# Patient Record
Sex: FEMALE | Race: WHITE | Employment: OTHER | URBAN - METROPOLITAN AREA
[De-identification: names, ages, dates, MRNs, and addresses within clinical notes are randomized per-mention and may not be internally consistent; named-entity substitution may affect disease eponyms.]

---

## 2019-08-14 ENCOUNTER — APPOINTMENT (OUTPATIENT)
Dept: ULTRASOUND IMAGING | Age: 64
DRG: 439 | End: 2019-08-14
Attending: FAMILY MEDICINE
Payer: COMMERCIAL

## 2019-08-14 ENCOUNTER — HOSPITAL ENCOUNTER (INPATIENT)
Age: 64
LOS: 5 days | Discharge: HOME | DRG: 439 | End: 2019-08-19
Attending: INTERNAL MEDICINE | Admitting: FAMILY MEDICINE
Payer: COMMERCIAL

## 2019-08-14 PROBLEM — N17.9 AKI (ACUTE KIDNEY INJURY) (HCC): Status: ACTIVE | Noted: 2019-08-14

## 2019-08-14 PROBLEM — E87.5 HYPERKALEMIA: Status: ACTIVE | Noted: 2019-08-14

## 2019-08-14 PROBLEM — K85.90 ACUTE PANCREATITIS: Status: ACTIVE | Noted: 2019-08-14
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
## 2019-08-14 PROBLEM — R79.89 ELEVATED LFTS: Status: ACTIVE | Noted: 2019-08-14

## 2019-08-14 PROBLEM — R10.9 ABDOMINAL PAIN: Status: ACTIVE | Noted: 2019-08-14

## 2019-08-14 LAB
AMPHET UR QL SCN: NEGATIVE
BARBITURATES UR QL SCN: NEGATIVE
BENZODIAZ UR QL: NEGATIVE
CANNABINOIDS UR QL SCN: NEGATIVE
CHOLEST SERPL-MCNC: 120 MG/DL
COCAINE UR QL SCN: NEGATIVE
ETHANOL SERPL-MCNC: <3 MG/DL
HDLC SERPL-MCNC: 21 MG/DL (ref 40–60)
HDLC SERPL: 5.7 {RATIO}
LDLC SERPL CALC-MCNC: 79.6 MG/DL
LIPASE SERPL-CCNC: 483 U/L (ref 73–393)
LIPID PROFILE,FLP: ABNORMAL
METHADONE UR QL: NEGATIVE
OPIATES UR QL: POSITIVE
PCP UR QL: NEGATIVE
TRIGL SERPL-MCNC: 97 MG/DL (ref 35–150)
VLDLC SERPL CALC-MCNC: 19.4 MG/DL (ref 6–23)

## 2019-08-14 PROCEDURE — 80307 DRUG TEST PRSMV CHEM ANLYZR: CPT

## 2019-08-14 PROCEDURE — 85025 COMPLETE CBC W/AUTO DIFF WBC: CPT

## 2019-08-14 PROCEDURE — 77030020263 HC SOL INJ SOD CL0.9% LFCR 1000ML

## 2019-08-14 PROCEDURE — 80061 LIPID PANEL: CPT

## 2019-08-14 PROCEDURE — 74011250636 HC RX REV CODE- 250/636: Performed by: FAMILY MEDICINE

## 2019-08-14 PROCEDURE — 80074 ACUTE HEPATITIS PANEL: CPT

## 2019-08-14 PROCEDURE — 65270000029 HC RM PRIVATE

## 2019-08-14 PROCEDURE — 36415 COLL VENOUS BLD VENIPUNCTURE: CPT

## 2019-08-14 PROCEDURE — 76705 ECHO EXAM OF ABDOMEN: CPT

## 2019-08-14 PROCEDURE — 83690 ASSAY OF LIPASE: CPT

## 2019-08-14 RX ORDER — HEPARIN SODIUM 5000 [USP'U]/ML
5000 INJECTION, SOLUTION INTRAVENOUS; SUBCUTANEOUS EVERY 8 HOURS
Status: DISCONTINUED | OUTPATIENT
Start: 2019-08-14 | End: 2019-08-19 | Stop reason: HOSPADM

## 2019-08-14 RX ORDER — ACETAMINOPHEN 325 MG/1
650 TABLET ORAL
Status: DISCONTINUED | OUTPATIENT
Start: 2019-08-14 | End: 2019-08-19 | Stop reason: HOSPADM

## 2019-08-14 RX ORDER — SODIUM CHLORIDE 0.9 % (FLUSH) 0.9 %
5-40 SYRINGE (ML) INJECTION EVERY 8 HOURS
Status: DISCONTINUED | OUTPATIENT
Start: 2019-08-14 | End: 2019-08-19 | Stop reason: HOSPADM

## 2019-08-14 RX ORDER — SODIUM CHLORIDE 9 MG/ML
150 INJECTION, SOLUTION INTRAVENOUS CONTINUOUS
Status: DISCONTINUED | OUTPATIENT
Start: 2019-08-14 | End: 2019-08-15

## 2019-08-14 RX ORDER — HYDROMORPHONE HYDROCHLORIDE 1 MG/ML
0.5 INJECTION, SOLUTION INTRAMUSCULAR; INTRAVENOUS; SUBCUTANEOUS
Status: DISCONTINUED | OUTPATIENT
Start: 2019-08-14 | End: 2019-08-15

## 2019-08-14 RX ORDER — ADHESIVE BANDAGE
30 BANDAGE TOPICAL DAILY PRN
Status: DISCONTINUED | OUTPATIENT
Start: 2019-08-14 | End: 2019-08-19 | Stop reason: HOSPADM

## 2019-08-14 RX ORDER — SODIUM CHLORIDE 0.9 % (FLUSH) 0.9 %
5-40 SYRINGE (ML) INJECTION AS NEEDED
Status: DISCONTINUED | OUTPATIENT
Start: 2019-08-14 | End: 2019-08-19 | Stop reason: HOSPADM

## 2019-08-14 RX ORDER — ONDANSETRON 2 MG/ML
4 INJECTION INTRAMUSCULAR; INTRAVENOUS
Status: DISCONTINUED | OUTPATIENT
Start: 2019-08-14 | End: 2019-08-19 | Stop reason: HOSPADM

## 2019-08-14 RX ADMIN — HEPARIN SODIUM 5000 UNITS: 5000 INJECTION INTRAVENOUS; SUBCUTANEOUS at 22:29

## 2019-08-14 RX ADMIN — HYDROMORPHONE HYDROCHLORIDE 0.5 MG: 1 INJECTION, SOLUTION INTRAMUSCULAR; INTRAVENOUS; SUBCUTANEOUS at 22:25

## 2019-08-14 RX ADMIN — SODIUM CHLORIDE 150 ML/HR: 900 INJECTION, SOLUTION INTRAVENOUS at 22:15

## 2019-08-14 RX ADMIN — Medication 10 ML: at 22:26

## 2019-08-14 NOTE — H&P
HOSPITALIST INITIAL HISTORY AND PHYSICAL    NAME:  Joao Giordano   Age:  62 y.o.  :   1955   MRN:   [id number]  PCP: Tanner Aragon  Consulting MD:  Treatment Team: Attending Provider: Shlomo Aguilar DO    CHIEF COMPLAINT: myalgias    HISTORY OF PRESENT ILLNESS:   Joao Giordano is a 62 y.o. female who presents as a direct admit from [facility] for abnormal CT abdomen showing possible pancreatitis. She presented initially for feeling weak and painful all over for the past several days. She also admits to a fall yesterday. Denies any fevers, but admits to chills. Admits to nausea, denies vomiting. Admits to diarrhea. David Watts REVIEW OF SYSTEMS: Comprehensive ROS performed. Denies fevers, vomiting, otherwise negative. No past medical history on file. No past surgical history on file. None       Allergies not on file    FAMILY HISTORY: Reviewed. Negative except No family history on file. Social History     Tobacco Use    Smoking status: Not on file   Substance Use Topics    Alcohol use: Not on file         Objective:     Visit Vitals  /79 (BP 1 Location: Right arm, BP Patient Position: At rest)   Pulse 97   Temp 97.3 °F (36.3 °C)   Resp 18   SpO2 92%      Temp (24hrs), Av.3 °F (36.3 °C), Min:97.3 °F (36.3 °C), Max:97.3 °F (36.3 °C)    Oxygen Therapy  O2 Sat (%): 92 % (19)  Physical Exam:  General:    The patient is an anxious appearing  Middle aged female appearing older than stated age in no acute distress. Head:   Normocephalic/atraumatic. Eyes:  No palpebral pallor or scleral icterus. ENT:  External auricular and nasal exam within normal limits. Mucous membranes are moist.  Neck:  Supple, non-tender, no JVD. Lungs:   Clear to auscultation bilaterally without wheezes or crackles. No respiratory distress or accessory muscle use. Heart:   Regular rate and rhythm, without murmurs, rubs, or gallops.   Abdomen:   Soft, mildly TTP over epigastrium, non-distended with normoactive bowel sounds. Genitourinary: No tenderness over the bladder or bilateral CVAs. Extremities: Without clubbing, cyanosis, or edema. Skin:     Normal color, texture, and turgor. No rashes, lesions, or jaundice. Pulses: Radial and dorsalis pedis pulses present 2+ bilaterally. Capillary refill <2s. Neurologic: CN II-XII grossly intact and symmetrical.     Moving all four extremities well with no focal deficits. Psychiatric: Pleasant demeanor, anxious affect. Alert and oriented x 3    Data Review: At [facility] MD [place] 24 8/14/19:  WBC 10.1, Hbg 13.1, Plt 199  Na 128, K 6.2, CO2 21, ALT 1244, , , Cr 5.9, GGT 40, TBili 0.8, TProt 6.9, Alk phos 77, Alb 3.6  INR 1.0  U/A Yellow, clear, Glc neg, Bili neg, Ket neg, Mod Blood, pH 5.5, Prot 30, Urobil 0.2, Nit neg, LE neg  Trop < 0.05  CK MB> 80  Lipase ? Imaging Gabi Ebbs /Studies: At Emergency MD [place] 8/14/19:  EKG: Sinus tach, 108, no STchanges    CT Chest/Abd/Pelv wo contrast:   \"No pneumothorax. Trace bilateral pleural effusions. No intra-abdominal or intrapelvic hemorrhage. Please note that traumatic intra-abdominal organ injury cannot be completely excluded in the absence of intravenous contrast.   Peripancreatic inflammation suspicious for acute pancreatitis. Adjacent fluid collection in the lesser sac as above, presumably a pseudocyst. Correlate with clinical presentation and serum pancreatic markers. Bilateral breast implants with suspicion for intracapsular rupture. ELECTRONICALLY SIGNED BY: Kade Hoffmann MD Aug 14, 2019\"    Assessment and Plan:     Principal Problem:    Acute pancreatitis (8/14/2019)    Mild, given lipase in 400s. Lipase level was not reported on records from [facility]. RUQ unremarkable for biliary/pancreatic obstruction. LIpid panel pending. Pt denies alcohol use, BAL negative.      Active Problems:    Abdominal pain (8/14/2019)    Likely gastritis/mild pancreatitis. Follow cliniclaly. Fall (8/14/2019)    PT/OT consults. ANABEL (acute kidney injury) ([facility] [address] (8/14/2019)    Likely dehydration. IVF, follow BMP. Elevated LFTs (0/47/3412)    Uncertain etiology. RUQ shows normal appearing liver. IVF, hepatitis panel pending. Hyperkalemia (8/14/2019)    6.2 at [facility], repeat pending. IVF. DVT Prophylaxis: Heparin      Code Status: FULL CODE      Disposition: Admit to med/surg for evaluation and treatment as per above.       Anticipated discharge: 2-3 days     Signed By: Hannah Purdy MD     August 14, 2019

## 2019-08-15 PROBLEM — K85.00 IDIOPATHIC ACUTE PANCREATITIS WITHOUT INFECTION OR NECROSIS: Status: ACTIVE | Noted: 2019-08-14

## 2019-08-15 LAB
ALBUMIN SERPL-MCNC: 2.7 G/DL (ref 3.2–4.6)
ALBUMIN/GLOB SERPL: 0.7 {RATIO} (ref 1.2–3.5)
ALP SERPL-CCNC: 105 U/L (ref 50–136)
ALT SERPL-CCNC: 995 U/L (ref 12–65)
ANION GAP SERPL CALC-SCNC: 15 MMOL/L (ref 7–16)
AST SERPL-CCNC: 602 U/L (ref 15–37)
BASOPHILS NFR BLD: 0 % (ref 0–2)
BILIRUB SERPL-MCNC: 0.7 MG/DL (ref 0.2–1.1)
BUN SERPL-MCNC: 101 MG/DL (ref 8–23)
CALCIUM SERPL-MCNC: 8.6 MG/DL (ref 8.3–10.4)
CHLORIDE SERPL-SCNC: 109 MMOL/L (ref 98–107)
CO2 SERPL-SCNC: 14 MMOL/L (ref 21–32)
CREAT SERPL-MCNC: 4.99 MG/DL (ref 0.6–1)
DIFFERENTIAL METHOD BLD: ABNORMAL
EOSINOPHIL NFR BLD: 0 % (ref 0.5–7.8)
ERYTHROCYTE [DISTWIDTH] IN BLOOD BY AUTOMATED COUNT: 12.9 % (ref 11.9–14.6)
GLOBULIN SER CALC-MCNC: 3.8 G/DL (ref 2.3–3.5)
GLUCOSE BLD STRIP.AUTO-MCNC: 55 MG/DL (ref 65–100)
GLUCOSE BLD STRIP.AUTO-MCNC: 85 MG/DL (ref 65–100)
GLUCOSE SERPL-MCNC: 46 MG/DL (ref 65–100)
HCT VFR BLD AUTO: 35.5 % (ref 35.8–46.3)
HGB BLD-MCNC: 12 G/DL (ref 11.7–15.4)
IMM GRANULOCYTES NFR BLD AUTO: 0 % (ref 0–5)
LIPASE SERPL-CCNC: 355 U/L (ref 73–393)
LYMPHOCYTES NFR BLD: 5 % (ref 13–44)
MCH RBC QN AUTO: 31.8 PG (ref 26.1–32.9)
MCHC RBC AUTO-ENTMCNC: 33.8 G/DL (ref 31.4–35)
MCV RBC AUTO: 94.2 FL (ref 79.6–97.8)
MONOCYTES NFR BLD: 6 % (ref 4–12)
NEUTS SEG NFR BLD: 89 % (ref 43–78)
NRBC # BLD: 0 K/UL (ref 0–0.2)
PLATELET # BLD AUTO: 130 K/UL (ref 150–450)
PLATELET COMMENTS,PCOM: SLIGHT
PMV BLD AUTO: 10.4 FL (ref 9.4–12.3)
POTASSIUM SERPL-SCNC: 5.1 MMOL/L (ref 3.5–5.1)
PROT SERPL-MCNC: 6.5 G/DL (ref 6.3–8.2)
RBC # BLD AUTO: 3.77 M/UL (ref 4.05–5.2)
RBC MORPH BLD: ABNORMAL
SODIUM SERPL-SCNC: 138 MMOL/L (ref 136–145)
WBC # BLD AUTO: ABNORMAL K/UL (ref 4.3–11.1)
WBC MORPH BLD: ABNORMAL

## 2019-08-15 PROCEDURE — 74011000250 HC RX REV CODE- 250: Performed by: INTERNAL MEDICINE

## 2019-08-15 PROCEDURE — 36415 COLL VENOUS BLD VENIPUNCTURE: CPT

## 2019-08-15 PROCEDURE — 80053 COMPREHEN METABOLIC PANEL: CPT

## 2019-08-15 PROCEDURE — C9113 INJ PANTOPRAZOLE SODIUM, VIA: HCPCS | Performed by: INTERNAL MEDICINE

## 2019-08-15 PROCEDURE — 77030020263 HC SOL INJ SOD CL0.9% LFCR 1000ML

## 2019-08-15 PROCEDURE — 74011250637 HC RX REV CODE- 250/637: Performed by: INTERNAL MEDICINE

## 2019-08-15 PROCEDURE — 74011250636 HC RX REV CODE- 250/636: Performed by: INTERNAL MEDICINE

## 2019-08-15 PROCEDURE — 74011250636 HC RX REV CODE- 250/636: Performed by: FAMILY MEDICINE

## 2019-08-15 PROCEDURE — 65270000029 HC RM PRIVATE

## 2019-08-15 PROCEDURE — 82962 GLUCOSE BLOOD TEST: CPT

## 2019-08-15 PROCEDURE — 74011000258 HC RX REV CODE- 258: Performed by: INTERNAL MEDICINE

## 2019-08-15 PROCEDURE — 83690 ASSAY OF LIPASE: CPT

## 2019-08-15 RX ORDER — HYDROMORPHONE HYDROCHLORIDE 1 MG/ML
1 INJECTION, SOLUTION INTRAMUSCULAR; INTRAVENOUS; SUBCUTANEOUS
Status: DISCONTINUED | OUTPATIENT
Start: 2019-08-15 | End: 2019-08-18

## 2019-08-15 RX ORDER — LORAZEPAM 1 MG/1
1 TABLET ORAL
Status: DISCONTINUED | OUTPATIENT
Start: 2019-08-15 | End: 2019-08-19 | Stop reason: HOSPADM

## 2019-08-15 RX ORDER — HYDROMORPHONE HYDROCHLORIDE 1 MG/ML
1 INJECTION, SOLUTION INTRAMUSCULAR; INTRAVENOUS; SUBCUTANEOUS
Status: DISCONTINUED | OUTPATIENT
Start: 2019-08-15 | End: 2019-08-15

## 2019-08-15 RX ORDER — PANTOPRAZOLE SODIUM 40 MG/1
40 TABLET, DELAYED RELEASE ORAL
Status: DISCONTINUED | OUTPATIENT
Start: 2019-08-15 | End: 2019-08-15

## 2019-08-15 RX ORDER — LORAZEPAM 2 MG/ML
1 INJECTION INTRAMUSCULAR
Status: ACTIVE | OUTPATIENT
Start: 2019-08-15 | End: 2019-08-15

## 2019-08-15 RX ORDER — SODIUM CHLORIDE 0.9 % (FLUSH) 0.9 %
5-40 SYRINGE (ML) INJECTION AS NEEDED
Status: DISCONTINUED | OUTPATIENT
Start: 2019-08-15 | End: 2019-08-19 | Stop reason: HOSPADM

## 2019-08-15 RX ORDER — SODIUM CHLORIDE 0.9 % (FLUSH) 0.9 %
5-40 SYRINGE (ML) INJECTION EVERY 8 HOURS
Status: DISCONTINUED | OUTPATIENT
Start: 2019-08-15 | End: 2019-08-19 | Stop reason: HOSPADM

## 2019-08-15 RX ORDER — HYDROMORPHONE HYDROCHLORIDE 1 MG/ML
2 INJECTION, SOLUTION INTRAMUSCULAR; INTRAVENOUS; SUBCUTANEOUS
Status: DISCONTINUED | OUTPATIENT
Start: 2019-08-15 | End: 2019-08-18

## 2019-08-15 RX ADMIN — HEPARIN SODIUM 5000 UNITS: 5000 INJECTION INTRAVENOUS; SUBCUTANEOUS at 21:01

## 2019-08-15 RX ADMIN — HEPARIN SODIUM 5000 UNITS: 5000 INJECTION INTRAVENOUS; SUBCUTANEOUS at 05:13

## 2019-08-15 RX ADMIN — Medication 5 ML: at 01:27

## 2019-08-15 RX ADMIN — HYDROMORPHONE HYDROCHLORIDE 0.5 MG: 1 INJECTION, SOLUTION INTRAMUSCULAR; INTRAVENOUS; SUBCUTANEOUS at 02:17

## 2019-08-15 RX ADMIN — SODIUM BICARBONATE: 84 INJECTION, SOLUTION INTRAVENOUS at 12:53

## 2019-08-15 RX ADMIN — HYDROMORPHONE HYDROCHLORIDE 2 MG: 1 INJECTION, SOLUTION INTRAMUSCULAR; INTRAVENOUS; SUBCUTANEOUS at 23:06

## 2019-08-15 RX ADMIN — HYDROMORPHONE HYDROCHLORIDE 2 MG: 1 INJECTION, SOLUTION INTRAMUSCULAR; INTRAVENOUS; SUBCUTANEOUS at 15:36

## 2019-08-15 RX ADMIN — HYDROMORPHONE HYDROCHLORIDE 1 MG: 1 INJECTION, SOLUTION INTRAMUSCULAR; INTRAVENOUS; SUBCUTANEOUS at 08:54

## 2019-08-15 RX ADMIN — SODIUM CHLORIDE 40 MG: 9 INJECTION, SOLUTION INTRAMUSCULAR; INTRAVENOUS; SUBCUTANEOUS at 12:06

## 2019-08-15 RX ADMIN — Medication 10 ML: at 21:11

## 2019-08-15 RX ADMIN — HEPARIN SODIUM 5000 UNITS: 5000 INJECTION INTRAVENOUS; SUBCUTANEOUS at 12:25

## 2019-08-15 RX ADMIN — Medication 10 ML: at 05:13

## 2019-08-15 RX ADMIN — HYDROMORPHONE HYDROCHLORIDE 2 MG: 1 INJECTION, SOLUTION INTRAMUSCULAR; INTRAVENOUS; SUBCUTANEOUS at 12:06

## 2019-08-15 RX ADMIN — SODIUM CHLORIDE 150 ML/HR: 900 INJECTION, SOLUTION INTRAVENOUS at 06:14

## 2019-08-15 RX ADMIN — PANTOPRAZOLE SODIUM 40 MG: 40 TABLET, DELAYED RELEASE ORAL at 08:53

## 2019-08-15 RX ADMIN — HYDROMORPHONE HYDROCHLORIDE 2 MG: 1 INJECTION, SOLUTION INTRAMUSCULAR; INTRAVENOUS; SUBCUTANEOUS at 18:41

## 2019-08-15 RX ADMIN — Medication 10 ML: at 05:12

## 2019-08-15 RX ADMIN — Medication 10 ML: at 12:15

## 2019-08-15 NOTE — PROGRESS NOTES
Patient arrived room 821 via stretcher accompanied by transport. Patient is oriented to room. Dual skin assessment completed with Hugo Magallon . Redness on sacrum. No skin tear nor wound noted. Respirations are even and unlabored. No distress at this time. Safety measures in place.

## 2019-08-15 NOTE — PROGRESS NOTES
AMBERLY recevied from [facility]. Patient remains in stable condition with respirations even/unlabored. No acute distress noted at this time. Patient on room air. IVF infusing at 150 cc/hr. Call light remains within reach, patient encouraged to call nurse prn assist. Will continue to monitor per policy.

## 2019-08-15 NOTE — PROGRESS NOTES
PT note:    Chart reviewed and attempted PT evaluation this afternoon however pt lethargic having difficulty keeping eyes opened. Will check back later time/date as schedule allows.      Thanks,  Pee Taylor, BRENDAT

## 2019-08-15 NOTE — PROGRESS NOTES
Hospitalist Note     Admit Date:  2019  7:27 PM   Name:  Salma Bhat   Age:  62 y.o.  :  1955   MRN:  [id number]   PCP:  Toño Jaramillo DO  Treatment Team: Attending Provider: Jonathan Solorzano; Staff Nurse: Kaleb Perry    HPI/Subjective:   Pt is a 61 y/o F with no med hx who presented to outside urgent care with severe abd pain. Had abnormal CT showing possible pancreatitis and mildly elevated lipase. Admitted to [facility], made NPO, started on IVF and dilaudid    8/15 - pt still with severe epigastric abd pain, worse with movement. Better with dilaudid. No n/v, diarrhea, fevers    Objective:     Patient Vitals for the past 24 hrs:   Temp Pulse Resp BP SpO2   08/15/19 0717 98.4 °F (36.9 °C) 94 19 133/72 94 %   08/15/19 0307 97.7 °F (36.5 °C) 100 18 126/83 90 %   19 2253 98 °F (36.7 °C) 91 18 127/85 90 %   19 1947 97.3 °F (36.3 °C) 97 18 136/79 92 %     Oxygen Therapy  O2 Sat (%): 94 % (08/15/19 07)  Estimated body mass index is 21.76 kg/m² as calculated from the following:    Height as of this encounter: 5' 9.5\" (1.765 m). Weight as of this encounter: 67.8 kg (149 lb 7.6 oz). Intake/Output Summary (Last 24 hours) at 8/15/2019 0834  Last data filed at 8/15/2019 9385  Gross per 24 hour   Intake 780 ml   Output 1000 ml   Net -220 ml       *Note that automatically entered I/Os may not be accurate; dependent on patient compliance with collection and accurate  by techs. General:    Well nourished. Alert. Abdomen:   Soft, nondistended. TTP epigastric   Extremities: Warm and dry. No cyanosis or edema. Skin:     No rashes or jaundice.    Neuro:  No gross focal deficits    Data Review:  I have reviewed all labs, meds, and studies from the last 24 hours:    Recent Results (from the past 24 hour(s))   DRUG SCREEN, URINE    Collection Time: 19  9:32 PM   Result Value Ref Range    PCP(PHENCYCLIDINE) NEGATIVE       BENZODIAZEPINES NEGATIVE COCAINE NEGATIVE       AMPHETAMINES NEGATIVE       METHADONE NEGATIVE       THC (TH-CANNABINOL) NEGATIVE       OPIATES POSITIVE      BARBITURATES NEGATIVE      LIPID PANEL    Collection Time: 08/14/19 10:05 PM   Result Value Ref Range    LIPID PROFILE          Cholesterol, total 120 <200 MG/DL    Triglyceride 97 35 - 150 MG/DL    HDL Cholesterol 21 (L) 40 - 60 MG/DL    LDL, calculated 79.6 <100 MG/DL    VLDL, calculated 19.4 6.0 - 23.0 MG/DL    CHOL/HDL Ratio 5.7     LIPASE    Collection Time: 08/14/19 10:05 PM   Result Value Ref Range    Lipase 483 (H) 73 - 393 U/L   ETHYL ALCOHOL    Collection Time: 08/14/19 10:05 PM   Result Value Ref Range    ALCOHOL(ETHYL),SERUM <3 MG/DL   CBC WITH AUTOMATED DIFF    Collection Time: 08/14/19 10:05 PM   Result Value Ref Range    WBC PERIPHERAL REVIEW TO FOLLOW 4.3 - 11.1 K/uL    RBC 3.77 (L) 4.05 - 5.2 M/uL    HGB 12.0 11.7 - 15.4 g/dL    HCT 35.5 (L) 35.8 - 46.3 %    MCV 94.2 79.6 - 97.8 FL    MCH 31.8 26.1 - 32.9 PG    MCHC 33.8 31.4 - 35.0 g/dL    RDW 12.9 11.9 - 14.6 %    PLATELET 403 (L) 327 - 450 K/uL    MPV 10.4 9.4 - 12.3 FL    ABSOLUTE NRBC 0.00 0.0 - 0.2 K/uL    NEUTROPHILS 89 (H) 43 - 78 %    LYMPHOCYTES 5 (L) 13 - 44 %    MONOCYTES 6 4.0 - 12.0 %    EOSINOPHILS 0 (L) 0.5 - 7.8 %    BASOPHILS 0 0.0 - 2.0 %    IMMATURE GRANULOCYTES 0 0.0 - 5.0 %    RBC COMMENTS NORMOCYTIC/NORMOCHROMIC      WBC COMMENTS Result Confirmed By Smear      PLATELET COMMENTS SLIGHT      DF AUTOMATED     LIPASE    Collection Time: 08/15/19  7:15 AM   Result Value Ref Range    Lipase 355 73 - 393 U/L        All Micro Results     None          No results found for this visit on 08/14/19.     Current Meds:  Current Facility-Administered Medications   Medication Dose Route Frequency    sodium chloride (NS) flush 5-40 mL  5-40 mL IntraVENous Q8H    sodium chloride (NS) flush 5-40 mL  5-40 mL IntraVENous PRN    LORazepam (ATIVAN) tablet 1 mg  1 mg Oral Q1H PRN    LORazepam (ATIVAN) tablet 1 mg  1 mg Oral Q4H PRN    HYDROmorphone (PF) (DILAUDID) injection 1 mg  1 mg IntraVENous Q4H PRN    pantoprazole (PROTONIX) tablet 40 mg  40 mg Oral ACB    acetaminophen (TYLENOL) tablet 650 mg  650 mg Oral Q4H PRN    ondansetron (ZOFRAN) injection 4 mg  4 mg IntraVENous Q4H PRN    magnesium hydroxide (MILK OF MAGNESIA) 400 mg/5 mL oral suspension 30 mL  30 mL Oral DAILY PRN    sodium chloride (NS) flush 5-40 mL  5-40 mL IntraVENous Q8H    sodium chloride (NS) flush 5-40 mL  5-40 mL IntraVENous PRN    0.9% sodium chloride infusion  125 mL/hr IntraVENous CONTINUOUS    heparin (porcine) injection 5,000 Units  5,000 Units SubCUTAneous Q8H       Other Studies (last 24 hours):  [address]    Result Date: 8/14/2019  EXAM: Limited abdomen ultrasound. INDICATION: Acute pancreatitis. COMPARISON: None. TECHNIQUE: Standard protocol limited right upper quadrant abdomen ultrasound. FINDINGS: - Liver: Within normal limits. - Gallbladder: There is nonspecific mild gallbladder wall thickening, measuring 3.2 mm. No sludge or gallstones are seen in the gallbladder lumen. There is no pericholecystic fluid. - Bile ducts: Within normal limits. The common bile duct measures 6.6 mm. - Pancreas: Within normal limits. The pancreatic duct measures 1.4 mm, which is within normal limits. - Right kidney: Within normal limits. - Aorta and IVC: Within normal limits. - Portal vein: Within normal limits. - Other: No ascites. IMPRESSION: Mild gallbladder wall thickening, otherwise, unremarkable study. Assessment and Plan:     Hospital Problems as of 8/15/2019 Never Reviewed          Codes Class Noted - Resolved POA    * (Principal) Acute pancreatitis ICD-10-CM: K85.90  ICD-9-CM: 410.7  8/14/2019 - Present Yes        Abdominal pain ICD-10-CM: R10.9  ICD-9-CM: 789.00  8/14/2019 - Present Yes        Fall ICD-10-CM: W19. [place] Handsome  ICD-9-CM: E888.9  8/14/2019 - Present Yes        Elevated LFTs ICD-10-CM: R94.5  ICD-9-CM: 790.6  8/14/2019 - Present Yes        ANABEL (acute kidney injury) ([facility] [address] ICD-10-CM: N17.9  ICD-9-CM: 584.9  8/14/2019 - Present Yes        Hyperkalemia ICD-10-CM: E87.5  ICD-9-CM: 276.7  8/14/2019 - Present Yes              Plan:  Pancreatitis  -labs pending today except lipase which is improved.   But pt still has severe pain  -cont IVF  -NPO except meds  -cont dilaudid IV PRN  -add protonix in case of gastritis component    ANABEL  -labs pending    DC planning/Dispo:    -Home when improved    Diet:  DIET NPO  DVT ppx:  heparin    Signed:  Merna Ochoa MD

## 2019-08-15 NOTE — PROGRESS NOTES
Blood glucose is 55. MD states okay to give apple juice, sips of clears or glutose as needed. Apple juice given x 2. Will recheck bedside glucose.

## 2019-08-15 NOTE — PROGRESS NOTES
Patient remains in stable condition with respirations even/unlabored. No acute distress noted. No needs noted or voiced at this time. Safety measures in place. IV has infiltrated. Patient has arm elevated with ice pack in place. Call light remains within reach. Preparing to give report to oncoming shift.

## 2019-08-16 LAB
ALBUMIN SERPL-MCNC: 2.5 G/DL (ref 3.2–4.6)
ALBUMIN/GLOB SERPL: 0.7 {RATIO} (ref 1.2–3.5)
ALP SERPL-CCNC: 90 U/L (ref 50–136)
ALT SERPL-CCNC: 687 U/L (ref 12–65)
ANION GAP SERPL CALC-SCNC: 10 MMOL/L (ref 7–16)
AST SERPL-CCNC: 259 U/L (ref 15–37)
BASOPHILS # BLD: 0 K/UL (ref 0–0.2)
BASOPHILS NFR BLD: 0 % (ref 0–2)
BILIRUB SERPL-MCNC: 0.7 MG/DL (ref 0.2–1.1)
BUN SERPL-MCNC: 86 MG/DL (ref 8–23)
CALCIUM SERPL-MCNC: 8.4 MG/DL (ref 8.3–10.4)
CHLORIDE SERPL-SCNC: 100 MMOL/L (ref 98–107)
CO2 SERPL-SCNC: 25 MMOL/L (ref 21–32)
CREAT SERPL-MCNC: 3.51 MG/DL (ref 0.6–1)
DIFFERENTIAL METHOD BLD: ABNORMAL
EOSINOPHIL # BLD: 0.1 K/UL (ref 0–0.8)
EOSINOPHIL NFR BLD: 1 % (ref 0.5–7.8)
ERYTHROCYTE [DISTWIDTH] IN BLOOD BY AUTOMATED COUNT: 12.9 % (ref 11.9–14.6)
GLOBULIN SER CALC-MCNC: 3.6 G/DL (ref 2.3–3.5)
GLUCOSE SERPL-MCNC: 93 MG/DL (ref 65–100)
HAV IGM SERPL QL IA: NEGATIVE
HBV CORE IGM SERPL QL IA: NEGATIVE
HBV SURFACE AG SERPL QL IA: NEGATIVE
HCT VFR BLD AUTO: 34.2 % (ref 35.8–46.3)
HCV AB S/CO SERPL IA: 11 S/CO RATIO (ref 0–0.9)
HGB BLD-MCNC: 11.8 G/DL (ref 11.7–15.4)
IMM GRANULOCYTES # BLD AUTO: 0 K/UL (ref 0–0.5)
IMM GRANULOCYTES NFR BLD AUTO: 0 % (ref 0–5)
LYMPHOCYTES # BLD: 0.7 K/UL (ref 0.5–4.6)
LYMPHOCYTES NFR BLD: 8 % (ref 13–44)
MCH RBC QN AUTO: 31.4 PG (ref 26.1–32.9)
MCHC RBC AUTO-ENTMCNC: 34.5 G/DL (ref 31.4–35)
MCV RBC AUTO: 91 FL (ref 79.6–97.8)
MONOCYTES # BLD: 0.7 K/UL (ref 0.1–1.3)
MONOCYTES NFR BLD: 8 % (ref 4–12)
NEUTS SEG # BLD: 7.4 K/UL (ref 1.7–8.2)
NEUTS SEG NFR BLD: 82 % (ref 43–78)
NRBC # BLD: 0 K/UL (ref 0–0.2)
PLATELET # BLD AUTO: 112 K/UL (ref 150–450)
PMV BLD AUTO: 9.5 FL (ref 9.4–12.3)
POTASSIUM SERPL-SCNC: 4.4 MMOL/L (ref 3.5–5.1)
PROT SERPL-MCNC: 6.1 G/DL (ref 6.3–8.2)
RBC # BLD AUTO: 3.76 M/UL (ref 4.05–5.2)
SODIUM SERPL-SCNC: 135 MMOL/L (ref 136–145)
WBC # BLD AUTO: 9 K/UL (ref 4.3–11.1)

## 2019-08-16 PROCEDURE — 65270000029 HC RM PRIVATE

## 2019-08-16 PROCEDURE — 36415 COLL VENOUS BLD VENIPUNCTURE: CPT

## 2019-08-16 PROCEDURE — 74011250636 HC RX REV CODE- 250/636: Performed by: FAMILY MEDICINE

## 2019-08-16 PROCEDURE — 85025 COMPLETE CBC W/AUTO DIFF WBC: CPT

## 2019-08-16 PROCEDURE — 74011000250 HC RX REV CODE- 250: Performed by: FAMILY MEDICINE

## 2019-08-16 PROCEDURE — 97530 THERAPEUTIC ACTIVITIES: CPT

## 2019-08-16 PROCEDURE — 74011250637 HC RX REV CODE- 250/637: Performed by: FAMILY MEDICINE

## 2019-08-16 PROCEDURE — 97161 PT EVAL LOW COMPLEX 20 MIN: CPT

## 2019-08-16 PROCEDURE — 74011250636 HC RX REV CODE- 250/636: Performed by: INTERNAL MEDICINE

## 2019-08-16 PROCEDURE — 80053 COMPREHEN METABOLIC PANEL: CPT

## 2019-08-16 RX ADMIN — HYDROMORPHONE HYDROCHLORIDE 2 MG: 1 INJECTION, SOLUTION INTRAMUSCULAR; INTRAVENOUS; SUBCUTANEOUS at 09:38

## 2019-08-16 RX ADMIN — Medication: at 04:50

## 2019-08-16 RX ADMIN — HYDROMORPHONE HYDROCHLORIDE 2 MG: 1 INJECTION, SOLUTION INTRAMUSCULAR; INTRAVENOUS; SUBCUTANEOUS at 02:59

## 2019-08-16 RX ADMIN — HEPARIN SODIUM 5000 UNITS: 5000 INJECTION INTRAVENOUS; SUBCUTANEOUS at 20:32

## 2019-08-16 RX ADMIN — Medication 10 ML: at 09:39

## 2019-08-16 RX ADMIN — Medication: at 20:41

## 2019-08-16 RX ADMIN — HYDROMORPHONE HYDROCHLORIDE 1 MG: 1 INJECTION, SOLUTION INTRAMUSCULAR; INTRAVENOUS; SUBCUTANEOUS at 20:31

## 2019-08-16 RX ADMIN — Medication 10 ML: at 05:26

## 2019-08-16 RX ADMIN — HYDROMORPHONE HYDROCHLORIDE 2 MG: 1 INJECTION, SOLUTION INTRAMUSCULAR; INTRAVENOUS; SUBCUTANEOUS at 06:36

## 2019-08-16 RX ADMIN — HEPARIN SODIUM 5000 UNITS: 5000 INJECTION INTRAVENOUS; SUBCUTANEOUS at 05:26

## 2019-08-16 RX ADMIN — HYDROMORPHONE HYDROCHLORIDE 2 MG: 1 INJECTION, SOLUTION INTRAMUSCULAR; INTRAVENOUS; SUBCUTANEOUS at 16:19

## 2019-08-16 RX ADMIN — ONDANSETRON 4 MG: 2 INJECTION INTRAMUSCULAR; INTRAVENOUS at 16:27

## 2019-08-16 RX ADMIN — LORAZEPAM 1 MG: 1 TABLET ORAL at 07:52

## 2019-08-16 RX ADMIN — ONDANSETRON 4 MG: 2 INJECTION INTRAMUSCULAR; INTRAVENOUS at 20:31

## 2019-08-16 RX ADMIN — Medication 10 ML: at 21:29

## 2019-08-16 NOTE — PROGRESS NOTES
Care Management Interventions  PCP Verified by CM: Yes  Physical Therapy Consult: Yes  Occupational Therapy Consult: No  Current Support Network: Lives Alone  Confirm Follow Up Transport: Family  Plan discussed with Pt/Family/Caregiver: Yes  Freedom of Choice Offered: Yes  Discharge Location  Discharge Placement: Home  Chart screened by  for discharge planning. No needs identified at this time. Please consult  if any new issues arise.

## 2019-08-16 NOTE — PROGRESS NOTES
Reassessment unchanged. Patient sleeping in bed. Awakens to voice, drowsy still from previous pain medication. Respirations present. Call light within reach. Will continue to monitor.

## 2019-08-16 NOTE — PROGRESS NOTES
PT note: Attempted PT evaluation again this morning however pt sleeping soundly and does not waken to PT voice. Will check back later time/date as schedule allows.     Thanks,  BRENDA MastT

## 2019-08-16 NOTE — PROGRESS NOTES
Elevated  blood pressure . Dr Price Kim. V. notified. MD says don't worry about it. No new orders received. Will monitor.

## 2019-08-16 NOTE — PROGRESS NOTES
Spiritual Care Visit, initial visit. Visit requested by RN.  attempted twice to visit; patient was being attended by others at time of attempts. Visit by Yeny Bhatia, Staff .  SHERLEY.Sung., Th.B., B.A.

## 2019-08-16 NOTE — PROGRESS NOTES
Hospitalist Note     Admit Date:  2019  7:27 PM   Name:  Nitza Casillas   Age:  62 y.o.  :  1955   MRN:  [id number]   PCP:  Tracy Gonzalez DO  Treatment Team: Attending Provider: Tracy Gonzalez DO; Care Manager: Elif Fay RN; Physical Therapist: Dariana Santos Staff Nurse: Aries Alvarado RN    HPI/Subjective:   Pt is a 63 y/o F with no med hx who presented to outside urgent care with severe abd pain. Had abnormal CT showing possible pancreatitis and mildly elevated lipase. Admitted to D, made NPO, started on IVF and dilaudid     - LFTs improved. Says abd pain the same. Pt with severe anxiety. Explained plan of care. No fevers, n/v.    Objective:     Patient Vitals for the past 24 hrs:   Temp Pulse Resp BP SpO2   19 0726 98.5 °F (36.9 °C) 93 20 139/88 93 %   19 0349 98.7 °F (37.1 °C) 98 18 (!) 144/93 95 %   19 0009 98.5 °F (36.9 °C) 92 19 (!) 138/93 96 %   08/15/19 1935 97.7 °F (36.5 °C) (!) 108 18 (!) 137/92 95 %   08/15/19 1530 98 °F (36.7 °C) 92 18 127/76 95 %   08/15/19 1120 98 °F (36.7 °C) (!) 111 18 137/87 92 %     Oxygen Therapy  O2 Sat (%): 93 % (19 0726)  Estimated body mass index is 23.84 kg/m² as calculated from the following:    Height as of this encounter: 5' 9.5\" (1.765 m). Weight as of this encounter: 74.3 kg (163 lb 12.8 oz). Intake/Output Summary (Last 24 hours) at 2019 0911  Last data filed at 2019 0349  Gross per 24 hour   Intake 525 ml   Output 1000 ml   Net -475 ml       *Note that automatically entered I/Os may not be accurate; dependent on patient compliance with collection and accurate  by techs. General:    Well nourished. Alert. Abdomen:   Soft, nondistended. TTP epigastric   Extremities: Warm and dry. No cyanosis or edema. Skin:     No rashes or jaundice.    Neuro:  No gross focal deficits    Data Review:  I have reviewed all labs, meds, and studies from the last 24 hours:    Recent Results (from the past 24 hour(s))   METABOLIC PANEL, COMPREHENSIVE    Collection Time: 08/15/19  9:16 AM   Result Value Ref Range    Sodium 138 136 - 145 mmol/L    Potassium 5.1 3.5 - 5.1 mmol/L    Chloride 109 (H) 98 - 107 mmol/L    CO2 14 (L) 21 - 32 mmol/L    Anion gap 15 7 - 16 mmol/L    Glucose 46 (L) 65 - 100 mg/dL     (H) 8 - 23 MG/DL    Creatinine 4.99 (H) 0.6 - 1.0 MG/DL    GFR est AA 11 (L) >60 ml/min/1.73m2    GFR est non-AA 9 (L) >60 ml/min/1.73m2    Calcium 8.6 8.3 - 10.4 MG/DL    Bilirubin, total 0.7 0.2 - 1.1 MG/DL    ALT (SGPT) 995 (H) 12 - 65 U/L    AST (SGOT) 602 (H) 15 - 37 U/L    Alk. phosphatase 105 50 - 136 U/L    Protein, total 6.5 6.3 - 8.2 g/dL    Albumin 2.7 (L) 3.2 - 4.6 g/dL    Globulin 3.8 (H) 2.3 - 3.5 g/dL    A-G Ratio 0.7 (L) 1.2 - 3.5     GLUCOSE, POC    Collection Time: 08/15/19 10:53 AM   Result Value Ref Range    Glucose (POC) 55 (L) 65 - 100 mg/dL   GLUCOSE, POC    Collection Time: 08/15/19 11:31 AM   Result Value Ref Range    Glucose (POC) 85 65 - 025 mg/dL   METABOLIC PANEL, COMPREHENSIVE    Collection Time: 08/16/19  6:45 AM   Result Value Ref Range    Sodium 135 (L) 136 - 145 mmol/L    Potassium 4.4 3.5 - 5.1 mmol/L    Chloride 100 98 - 107 mmol/L    CO2 25 21 - 32 mmol/L    Anion gap 10 7 - 16 mmol/L    Glucose 93 65 - 100 mg/dL    BUN 86 (H) 8 - 23 MG/DL    Creatinine 3.51 (H) 0.6 - 1.0 MG/DL    GFR est AA 17 (L) >60 ml/min/1.73m2    GFR est non-AA 14 (L) >60 ml/min/1.73m2    Calcium 8.4 8.3 - 10.4 MG/DL    Bilirubin, total 0.7 0.2 - 1.1 MG/DL    ALT (SGPT) 687 (H) 12 - 65 U/L    AST (SGOT) 259 (H) 15 - 37 U/L    Alk.  phosphatase 90 50 - 136 U/L    Protein, total 6.1 (L) 6.3 - 8.2 g/dL    Albumin 2.5 (L) 3.2 - 4.6 g/dL    Globulin 3.6 (H) 2.3 - 3.5 g/dL    A-G Ratio 0.7 (L) 1.2 - 3.5     CBC WITH AUTOMATED DIFF    Collection Time: 08/16/19  6:45 AM   Result Value Ref Range    WBC 9.0 4.3 - 11.1 K/uL    RBC 3.76 (L) 4.05 - 5.2 M/uL    HGB 11.8 11.7 - 15.4 g/dL    HCT 34.2 (L) 35.8 - 46.3 %    MCV 91.0 79.6 - 97.8 FL    MCH 31.4 26.1 - 32.9 PG    MCHC 34.5 31.4 - 35.0 g/dL    RDW 12.9 11.9 - 14.6 %    PLATELET 675 (L) 623 - 450 K/uL    MPV 9.5 9.4 - 12.3 FL    ABSOLUTE NRBC 0.00 0.0 - 0.2 K/uL    DF AUTOMATED      NEUTROPHILS 82 (H) 43 - 78 %    LYMPHOCYTES 8 (L) 13 - 44 %    MONOCYTES 8 4.0 - 12.0 %    EOSINOPHILS 1 0.5 - 7.8 %    BASOPHILS 0 0.0 - 2.0 %    IMMATURE GRANULOCYTES 0 0.0 - 5.0 %    ABS. NEUTROPHILS 7.4 1.7 - 8.2 K/UL    ABS. LYMPHOCYTES 0.7 0.5 - 4.6 K/UL    ABS. MONOCYTES 0.7 0.1 - 1.3 K/UL    ABS. EOSINOPHILS 0.1 0.0 - 0.8 K/UL    ABS. BASOPHILS 0.0 0.0 - 0.2 K/UL    ABS. IMM. GRANS. 0.0 0.0 - 0.5 K/UL        All Micro Results     None          No results found for this visit on 08/14/19. Current Meds:  Current Facility-Administered Medications   Medication Dose Route Frequency    famotidine (PF) (PEPCID) 20 mg in sodium chloride 0.9% 10 mL injection  20 mg IntraVENous DAILY    sodium bicarbonate (8.4%) 150 mEq in dextrose 5% 1,000 mL infusion   IntraVENous CONTINUOUS    sodium chloride (NS) flush 5-40 mL  5-40 mL IntraVENous Q8H    sodium chloride (NS) flush 5-40 mL  5-40 mL IntraVENous PRN    LORazepam (ATIVAN) tablet 1 mg  1 mg Oral Q1H PRN    LORazepam (ATIVAN) tablet 1 mg  1 mg Oral Q4H PRN    HYDROmorphone (PF) (DILAUDID) injection 1 mg  1 mg IntraVENous Q3H PRN    HYDROmorphone (PF) (DILAUDID) injection 2 mg  2 mg IntraVENous Q3H PRN    acetaminophen (TYLENOL) tablet 650 mg  650 mg Oral Q4H PRN    ondansetron (ZOFRAN) injection 4 mg  4 mg IntraVENous Q4H PRN    magnesium hydroxide (MILK OF MAGNESIA) 400 mg/5 mL oral suspension 30 mL  30 mL Oral DAILY PRN    sodium chloride (NS) flush 5-40 mL  5-40 mL IntraVENous Q8H    sodium chloride (NS) flush 5-40 mL  5-40 mL IntraVENous PRN    heparin (porcine) injection 5,000 Units  5,000 Units SubCUTAneous Q8H       Other Studies (last 24 hours):  No results found.     Assessment and Plan:     Hospital Problems as of 8/16/2019 Never Reviewed          Codes Class Noted - Resolved POA    * (Principal) Idiopathic acute pancreatitis without infection or necrosis ICD-10-CM: K85.00  ICD-9-CM: 577.0  8/14/2019 - Present Yes        Abdominal pain ICD-10-CM: R10.9  ICD-9-CM: 789.00  8/14/2019 - Present Yes        Fall ICD-10-CM: W19. Shlomo Keto  ICD-9-CM: E888.9  8/14/2019 - Present Yes        Elevated LFTs ICD-10-CM: R94.5  ICD-9-CM: 790.6  8/14/2019 - Present Yes        ANABEL (acute kidney injury) ([facility] [address] ICD-10-CM: N17.9  ICD-9-CM: 584.9  8/14/2019 - Present Yes        Hyperkalemia ICD-10-CM: E87.5  ICD-9-CM: 276.7  8/14/2019 - Present Yes              Plan:  Pancreatitis  -LFTs improved. CT abdomen outside hospital showed possible pancreatitis but otherwise unremarkable. US mild GB thickening but no other findings  -hepatitis panel pending  -lipase normalized 8/15  -cont IVF  -NPO except meds, occ clears prn comfort  -cont dilaudid IV PRN    ANABEL  -improving.   Cont IVF    DC planning/Dispo:    -Home when improved    Diet:  DIET NPO  DVT ppx:  heparin    Signed:  Bossman Esqueda MD

## 2019-08-16 NOTE — PROGRESS NOTES
Patient sitting up in bed with family at bedside. Respirations are even and unlabored. Patient is alert and oriented . No distress at this time. Bed is low, locked and call light within reach.

## 2019-08-16 NOTE — PROGRESS NOTES
Received bedside shift report from off going nurse, Daniela Salas. Patient resting in bed quietly. Respirations even and unlabored. Bed low and benjamin. Bedside table, personal belongings, call light all within reach.

## 2019-08-16 NOTE — PROGRESS NOTES
Spiritual Care Visit, initial visit. RN requested  to visit patient due to patient's emotional state.  has attempted to visit multiple times. Each time, patient was being attended by others (perhaps family).  unsure if visit is desired by patient. Will leave a message for week end  to follow up. Prayed for patient's healing and health. Visit by Redd Daniel, Staff .  M.Sung., Th.B., B.A.

## 2019-08-16 NOTE — INTERDISCIPLINARY ROUNDS
Interdisciplinary team rounds were held 8/16/2019 with the following team members:Care Management, Physical Therapy, Physician and Clinical Coordinator and the patient. Plan of care discussed. See clinical pathway and/or care plan for interventions and desired outcomes.

## 2019-08-16 NOTE — PROGRESS NOTES
Patient's daughter, Nnamdi Main @ [phone] requesting to speak with physician. Dr Glenis Robertson paged in regards.

## 2019-08-16 NOTE — PROGRESS NOTES
Problem: Mobility Impaired (Adult and Pediatric)  Goal: *Acute Goals and Plan of Care (Insert Text)  Description  Discharge Goals:  (1.)Ms. Zane Zhao will move from supine to sit and sit to supine  with INDEPENDENT within 7 treatment day(s). (2.)Ms. Zane Zhao will transfer from bed to chair and chair to bed with INDEPENDENT using the least restrictive device within 7 treatment day(s). (3.)Ms. Zane Zhao will ambulate with SUPERVISION for 250+ feet with the least restrictive device within 7 treatment day(s). ________________________________________________________________________________________________     Outcome: Progressing Towards Goal       PHYSICAL THERAPY: Initial Assessment and PM 8/16/2019  INPATIENT: PT Visit Days : 1  Payor: Mark Tong / Plan: [facility] [employer] [facility] / Product Type: PPO /       NAME/AGE/GENDER: Ellie Parra is a 62 y.o. female   PRIMARY DIAGNOSIS: Acute pancreatitis [K85.90] Idiopathic acute pancreatitis without infection or necrosis   Idiopathic acute pancreatitis without infection or necrosis          ICD-10: Treatment Diagnosis:    Generalized Muscle Weakness (M62.81)  Difficulty in walking, Not elsewhere classified (R26.2)   Precaution/Allergies:  Penicillins      ASSESSMENT:     Ms. Zane Zhao is standing up in bathroom upon contact reaching out to wall for support. PT entered room to provide assistance as pt had noted unsteadiness. Pt ambulated 10 ft bathroom to EOB with CGA-Elmer reaching out to furniture for support. Pt continues with drowsiness/lethargy this session. Pt ambulates with narrowed RENATO and shuffling delayed steps. Pt returned to sitting EOB requiring cues for safety of transfer. Pt sat EOB for several minutes and PT provided wet wash cloth as pt requested to wash face. Pt lives alone in 1 story home with 0 steps to enter. Pt reports independence with gait and ADLs at baseline.  Pt falling asleep sitting EOB requiring cues for increased alertness and attention to task. Pt assisted into supine requiring Elmer to lift LE into bed. Pt positioned for comfort and left supine in bed with all needs met and within reach. Nursing notified. Anthony Norton will benefit from skilled PT (medically necessary) to address decreased strength, decreased balance, decreased functional tolerance, decreased cardiopulmonary endurance affecting participation in basic ADLs and functional tasks. This section established at most recent assessment   PROBLEM LIST (Impairments causing functional limitations):  Decreased Strength  Decreased ADL/Functional Activities  Decreased Transfer Abilities  Decreased Ambulation Ability/Technique  Decreased Balance  Increased Pain  Decreased Activity Tolerance  Increased Fatigue  Decreased [place] with Home Exercise Program   INTERVENTIONS PLANNED: (Benefits and precautions of physical therapy have been discussed with the patient.)  Balance Exercise  Bed Mobility  Family Education  Gait Training  Home Exercise Program (HEP)  Neuromuscular Re-education/Strengthening  Therapeutic Activites  Therapeutic Exercise/Strengthening  Transfer Training     TREATMENT PLAN: Frequency/Duration: 3 times a week for duration of hospital stay  Rehabilitation Potential For Stated Goals: [facility] (at time of discharge pending progress):    Placement: It is my opinion, based on this patient's performance to date, that Ms. Dennis Gonzales may benefit from participating in 1-2 additional therapy sessions in order to continue to assess for rehab potential and then make recommendation for disposition at discharge. Equipment:   None at this time              HISTORY:   History of Present Injury/Illness (Reason for Referral):  See H&P below  Anthony Norton is a 62 y.o. female who presents as a direct admit from [facility] for abnormal CT abdomen showing possible pancreatitis. She presented initially for feeling weak and painful all over for the past several days. She also admits to a fall yesterday. Denies any fevers, but admits to chills. Admits to nausea, denies vomiting. Admits to diarrhea. .  Past Medical History/Comorbidities:   Ms. Jacques Galeazzi  has no past medical history on file. Ms. Jacques Galeazzi  has no past surgical history on file. Social History/Living Environment:   Home Environment: Private residence  # Steps to Enter: 0  One/Two Story Residence: One story  Living Alone: Yes  Support Systems: Family member(s)  Patient Expects to be Discharged to[de-identified] Private residence  Current DME Used/Available at Home: None  Prior Level of Function/Work/Activity:  Independent with all mobility. Number of Personal Factors/Comorbidities that affect the Plan of Care: 1-2: MODERATE COMPLEXITY   EXAMINATION:   Most Recent Physical Functioning:   Gross Assessment:  AROM: Within functional limits  Strength: Generally decreased, functional  Coordination: Generally decreased, functional               Posture:     Balance:  Sitting: Intact; Without support  Standing: Impaired Bed Mobility:  Sit to Supine: Minimum assistance  Wheelchair Mobility:     Transfers:     Gait:     Base of Support: Narrowed  Speed/Dianne: Slow;Shuffled  Step Length: Left shortened;Right shortened  Gait Abnormalities: Decreased step clearance;Trunk sway increased; Shuffling gait; Path deviations  Distance (ft): 10 Feet (ft)  Assistive Device: (none)  Ambulation - Level of Assistance: Contact guard assistance;Minimal assistance  Interventions: Safety awareness training; Tactile cues; Verbal cues      Body Structures Involved:  Nerves  Bones  Joints  Muscles  Ligaments Body Functions Affected:  Sensory/Pain  Cardio  Respiratory  Neuromusculoskeletal  Movement Related Activities and Participation Affected:  General Tasks and Demands  Mobility  Self Care  Domestic Life  Interpersonal Interactions and Relationships  Community, Social and Civic Life   Number of elements that affect the Plan of Care: 4+: HIGH COMPLEXITY   CLINICAL PRESENTATION:   Presentation: Evolving clinical presentation with changing clinical characteristics: MODERATE COMPLEXITY   CLINICAL DECISION MAKIN [facility] Mobility Inpatient Short Form  How much difficulty does the patient currently have. .. Unable A Lot A Little None   1. Turning over in bed (including adjusting bedclothes, sheets and blankets)? ? 1   ? 2   ? 3   ? 4   2. Sitting down on and standing up from a chair with arms ( e.g., wheelchair, bedside commode, etc.)   ? 1   ? 2   ? 3   ? 4   3. Moving from lying on back to sitting on the side of the bed?   ? 1   ? 2   ? 3   ? 4   How much help from another person does the patient currently need. .. Total A Lot A Little None   4. Moving to and from a bed to a chair (including a wheelchair)? ? 1   ? 2   ? 3   ? 4   5. Need to walk in hospital room? ? 1   ? 2   ? 3   ? 4   6. Climbing 3-5 steps with a railing? ? 1   ? 2   ? 3   ? 4   © , Trustees of [address] [place] [address], under license to [employer]. All rights reserved      Score:  Initial: 17 Most Recent: X (Date: -- )    Interpretation of Tool:  Represents activities that are increasingly more difficult (i.e. Bed mobility, Transfers, Gait). Medical Necessity:     Patient is expected to demonstrate progress in strength, balance, coordination and functional technique   to decrease assistance required with gait, transfers, and functional mobility. .  Reason for Services/Other Comments:  Patient continues to require skilled intervention due to decreased strength, decreased balance, decreased functional tolerance, decreased cardiopulmonary endurance affecting participation in basic ADLs and functional tasks   .    Use of outcome tool(s) and clinical judgement create a POC that gives a: Clear prediction of patient's progress: LOW COMPLEXITY            TREATMENT:   (In addition to Assessment/Re-Assessment sessions the following treatments were rendered) Pre-treatment Symptoms/Complaints:  pt very drowsy  Pain: Initial:   Pain Intensity 1: 5  Post Session:  Increased with mobility 6/10     Therapeutic Activity: (    8 minutes): Therapeutic activities including Bed transfers, Toilet transfers, Ambulation on level ground and cues for ease and safety of transfers, environmental awareness and safety  to improve mobility, strength, balance, coordination and reduce falls risk . Required minimal Safety awareness training; Tactile cues; Verbal cues to promote static and dynamic balance in standing and promote coordination of bilateral, lower extremity(s). Braces/Orthotics/Lines/Etc:   IV  Treatment/Session Assessment:    Response to Treatment:  Amb 10 ft with Mayda  Interdisciplinary Collaboration:   Physical Therapist  Registered Nurse  After treatment position/precautions:   Supine in bed  Bed/Chair-wheels locked  Bed in low position  Call light within reach  RN notified   Compliance with Program/Exercises: Will assess as treatment progresses  Recommendations/Intent for next treatment session: \"Next visit will focus on advancements to more challenging activities and reduction in assistance provided\".   Total Treatment Duration:  PT Patient Time In/Time Out  Time In: 1257  Time Out: 98 [facility]

## 2019-08-16 NOTE — PROGRESS NOTES
Assumed care of patient. Assessment completed and documented, see docflow. Patient awake in bed. Very emotional; crying. Pain meds given at 0636am. NAD noted at present. Respirations even and non labored on RA. IVF infusing, without difficulty. Encouraged to call for needs. Call light within reach. Will continue to monitor.
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
## 2019-08-17 PROBLEM — B19.20 HEPATITIS C: Status: ACTIVE | Noted: 2019-08-17

## 2019-08-17 PROBLEM — E87.5 HYPERKALEMIA: Status: RESOLVED | Noted: 2019-08-14 | Resolved: 2019-08-17

## 2019-08-17 LAB
ALBUMIN SERPL-MCNC: 2.1 G/DL (ref 3.2–4.6)
ALBUMIN/GLOB SERPL: 0.6 {RATIO} (ref 1.2–3.5)
ALP SERPL-CCNC: 83 U/L (ref 50–136)
ALT SERPL-CCNC: 420 U/L (ref 12–65)
ANION GAP SERPL CALC-SCNC: 7 MMOL/L (ref 7–16)
AST SERPL-CCNC: 115 U/L (ref 15–37)
BILIRUB SERPL-MCNC: 0.6 MG/DL (ref 0.2–1.1)
BUN SERPL-MCNC: 66 MG/DL (ref 8–23)
CALCIUM SERPL-MCNC: 8.3 MG/DL (ref 8.3–10.4)
CHLORIDE SERPL-SCNC: 98 MMOL/L (ref 98–107)
CO2 SERPL-SCNC: 35 MMOL/L (ref 21–32)
CREAT SERPL-MCNC: 2.64 MG/DL (ref 0.6–1)
GLOBULIN SER CALC-MCNC: 3.3 G/DL (ref 2.3–3.5)
GLUCOSE BLD STRIP.AUTO-MCNC: 103 MG/DL (ref 65–100)
GLUCOSE SERPL-MCNC: 112 MG/DL (ref 65–100)
POTASSIUM SERPL-SCNC: 3.9 MMOL/L (ref 3.5–5.1)
PROT SERPL-MCNC: 5.4 G/DL (ref 6.3–8.2)
SODIUM SERPL-SCNC: 140 MMOL/L (ref 136–145)

## 2019-08-17 PROCEDURE — 74011250636 HC RX REV CODE- 250/636: Performed by: FAMILY MEDICINE

## 2019-08-17 PROCEDURE — 65270000029 HC RM PRIVATE

## 2019-08-17 PROCEDURE — 80053 COMPREHEN METABOLIC PANEL: CPT

## 2019-08-17 PROCEDURE — 82962 GLUCOSE BLOOD TEST: CPT

## 2019-08-17 PROCEDURE — 87522 HEPATITIS C REVRS TRNSCRPJ: CPT

## 2019-08-17 PROCEDURE — 74011000258 HC RX REV CODE- 258: Performed by: INTERNAL MEDICINE

## 2019-08-17 PROCEDURE — 77030020253 HC SOL INJ D545NS .05 DEX .45 SAL

## 2019-08-17 PROCEDURE — 36415 COLL VENOUS BLD VENIPUNCTURE: CPT

## 2019-08-17 PROCEDURE — 74011250636 HC RX REV CODE- 250/636: Performed by: INTERNAL MEDICINE

## 2019-08-17 RX ORDER — DEXTROSE MONOHYDRATE AND SODIUM CHLORIDE 5; .45 G/100ML; G/100ML
125 INJECTION, SOLUTION INTRAVENOUS CONTINUOUS
Status: DISCONTINUED | OUTPATIENT
Start: 2019-08-17 | End: 2019-08-19 | Stop reason: HOSPADM

## 2019-08-17 RX ADMIN — DEXTROSE MONOHYDRATE AND SODIUM CHLORIDE 125 ML/HR: 5; .45 INJECTION, SOLUTION INTRAVENOUS at 22:17

## 2019-08-17 RX ADMIN — DEXTROSE MONOHYDRATE AND SODIUM CHLORIDE 125 ML/HR: 5; .45 INJECTION, SOLUTION INTRAVENOUS at 08:37

## 2019-08-17 RX ADMIN — HEPARIN SODIUM 5000 UNITS: 5000 INJECTION INTRAVENOUS; SUBCUTANEOUS at 21:31

## 2019-08-17 RX ADMIN — HYDROMORPHONE HYDROCHLORIDE 1 MG: 1 INJECTION, SOLUTION INTRAMUSCULAR; INTRAVENOUS; SUBCUTANEOUS at 21:31

## 2019-08-17 RX ADMIN — Medication 5 ML: at 05:07

## 2019-08-17 RX ADMIN — ONDANSETRON 4 MG: 2 INJECTION INTRAMUSCULAR; INTRAVENOUS at 21:31

## 2019-08-17 RX ADMIN — Medication 10 ML: at 14:13

## 2019-08-17 RX ADMIN — HYDROMORPHONE HYDROCHLORIDE 1 MG: 1 INJECTION, SOLUTION INTRAMUSCULAR; INTRAVENOUS; SUBCUTANEOUS at 01:26

## 2019-08-17 RX ADMIN — HEPARIN SODIUM 5000 UNITS: 5000 INJECTION INTRAVENOUS; SUBCUTANEOUS at 14:13

## 2019-08-17 RX ADMIN — Medication 10 ML: at 21:31

## 2019-08-17 RX ADMIN — HEPARIN SODIUM 5000 UNITS: 5000 INJECTION INTRAVENOUS; SUBCUTANEOUS at 05:07

## 2019-08-17 RX ADMIN — ONDANSETRON 4 MG: 2 INJECTION INTRAMUSCULAR; INTRAVENOUS at 17:02

## 2019-08-17 RX ADMIN — HYDROMORPHONE HYDROCHLORIDE 1 MG: 1 INJECTION, SOLUTION INTRAMUSCULAR; INTRAVENOUS; SUBCUTANEOUS at 14:13

## 2019-08-17 RX ADMIN — HYDROMORPHONE HYDROCHLORIDE 1 MG: 1 INJECTION, SOLUTION INTRAMUSCULAR; INTRAVENOUS; SUBCUTANEOUS at 05:07

## 2019-08-17 NOTE — PROGRESS NOTES
Patient requesting pain medication and Zofran. Administered Dilaudid 1 mg and Zofran 4 mg. Patient tolerated well.

## 2019-08-17 NOTE — PROGRESS NOTES
Completed bedside shift report with oncoming nurse, Heather Barrett. Patient resting in bed quietly. Respirations even and unlabored on room air. Bed low and locked. Bedside table, personal belongings s and call light all within reach.

## 2019-08-17 NOTE — PROGRESS NOTES
Hospitalist Note     Admit Date:  2019  7:27 PM   Name:  Carole Ureña   Age:  62 y.o.  :  1955   MRN:  [id number]   PCP:  Deysi Daniels DO  Treatment Team: Attending Provider: Marnie Valentino MD; Care Manager: Symnoe Dowd.; Primary Nurse: Silva Merlos; Staff Nurse: Ling Wall; Staff Nurse: Avril Fitzpatrick    HPI/Subjective:   Pt is a 61 y/o F with no med hx who presented to outside urgent care with severe abd pain. Had abnormal CT showing possible pancreatitis and mildly elevated lipase. Admitted to [facility], made NPO, started on IVF and dilaudid     - pt thirsty, hungry. abd pain still significant but improved. She wants more liquids to drink. Denies n/v currently. Objective:     Patient Vitals for the past 24 hrs:   Temp Pulse Resp BP SpO2   19 0748 98.1 °F (36.7 °C) 88 21 112/71 90 %   19 0418 97.7 °F (36.5 °C) 93 20 130/86 90 %   19 2341 98.3 °F (36.8 °C) 96 20 127/83 91 %   19 1926 97.8 °F (36.6 °C) 89 19 126/83 93 %   19 1514 98.2 °F (36.8 °C) 91 20 143/87 93 %   19 1137 97.7 °F (36.5 °C) 98 21 142/90 96 %     Oxygen Therapy  O2 Sat (%): 90 % (19 0748)  Estimated body mass index is 22.5 kg/m² as calculated from the following:    Height as of this encounter: 5' 9.5\" (1.765 m). Weight as of this encounter: 70.1 kg (154 lb 9.6 oz). Intake/Output Summary (Last 24 hours) at 2019 0838  Last data filed at 2019 0739  Gross per 24 hour   Intake 6119.5 ml   Output 3600 ml   Net 2519.5 ml       *Note that automatically entered I/Os may not be accurate; dependent on patient compliance with collection and accurate  by techs. General:    Well nourished. Alert. Abdomen:   Soft, nondistended. TTP diffuse  Extremities: Warm and dry. No cyanosis or edema. Skin:     No rashes or jaundice.    Neuro:  No gross focal deficits    Data Review:  I have reviewed all labs, meds, and studies from the last 24 hours:    Recent Results (from the past 24 hour(s))   METABOLIC PANEL, COMPREHENSIVE    Collection Time: 08/17/19  7:04 AM   Result Value Ref Range    Sodium 140 136 - 145 mmol/L    Potassium 3.9 3.5 - 5.1 mmol/L    Chloride 98 98 - 107 mmol/L    CO2 35 (H) 21 - 32 mmol/L    Anion gap 7 7 - 16 mmol/L    Glucose 112 (H) 65 - 100 mg/dL    BUN 66 (H) 8 - 23 MG/DL    Creatinine 2.64 (H) 0.6 - 1.0 MG/DL    GFR est AA 23 (L) >60 ml/min/1.73m2    GFR est non-AA 19 (L) >60 ml/min/1.73m2    Calcium 8.3 8.3 - 10.4 MG/DL    Bilirubin, total 0.6 0.2 - 1.1 MG/DL    ALT (SGPT) 420 (H) 12 - 65 U/L    AST (SGOT) 115 (H) 15 - 37 U/L    Alk. phosphatase 83 50 - 136 U/L    Protein, total 5.4 (L) 6.3 - 8.2 g/dL    Albumin 2.1 (L) 3.2 - 4.6 g/dL    Globulin 3.3 2.3 - 3.5 g/dL    A-G Ratio 0.6 (L) 1.2 - 3.5          All Micro Results     None          No results found for this visit on 08/14/19. Current Meds:  Current Facility-Administered Medications   Medication Dose Route Frequency    dextrose 5 % - 0.45% NaCl infusion  125 mL/hr IntraVENous CONTINUOUS    sodium chloride (NS) flush 5-40 mL  5-40 mL IntraVENous Q8H    sodium chloride (NS) flush 5-40 mL  5-40 mL IntraVENous PRN    LORazepam (ATIVAN) tablet 1 mg  1 mg Oral Q1H PRN    LORazepam (ATIVAN) tablet 1 mg  1 mg Oral Q4H PRN    HYDROmorphone (PF) (DILAUDID) injection 1 mg  1 mg IntraVENous Q3H PRN    HYDROmorphone (PF) (DILAUDID) injection 2 mg  2 mg IntraVENous Q3H PRN    acetaminophen (TYLENOL) tablet 650 mg  650 mg Oral Q4H PRN    ondansetron (ZOFRAN) injection 4 mg  4 mg IntraVENous Q4H PRN    magnesium hydroxide (MILK OF MAGNESIA) 400 mg/5 mL oral suspension 30 mL  30 mL Oral DAILY PRN    sodium chloride (NS) flush 5-40 mL  5-40 mL IntraVENous Q8H    sodium chloride (NS) flush 5-40 mL  5-40 mL IntraVENous PRN    heparin (porcine) injection 5,000 Units  5,000 Units SubCUTAneous Q8H       Other Studies (last 24 hours):  No results found.     Assessment and Plan:     Hospital Problems as of 8/17/2019 Never Reviewed          Codes Class Noted - Resolved POA    Hepatitis C ICD-10-CM: B19.20  ICD-9-CM: 070.70  8/17/2019 - Present Yes        * (Principal) Idiopathic acute pancreatitis without infection or necrosis ICD-10-CM: K85.00  ICD-9-CM: 485.3  8/14/2019 - Present Yes        Abdominal pain ICD-10-CM: R10.9  ICD-9-CM: 789.00  8/14/2019 - Present Yes        Elevated LFTs ICD-10-CM: R94.5  ICD-9-CM: 790.6  8/14/2019 - Present Yes        ANABEL (acute kidney injury) ([facility] [address] ICD-10-CM: N17.9  ICD-9-CM: 584.9  8/14/2019 - Present Yes        RESOLVED: Fall ICD-10-CM: [address] Luh  ICD-9-CM: E888.9  8/14/2019 - 8/17/2019 Yes        RESOLVED: Hyperkalemia ICD-10-CM: E87.5  ICD-9-CM: 276.7  8/14/2019 - 8/17/2019 Yes              Plan:  Pancreatitis  -LFTs improved. CT abdomen outside hospital showed possible pancreatitis but otherwise unremarkable. US mild GB thickening but no other findings  -lipase normalized 8/15  -cont IVF; she doesn't need bicarb so stop this. -DIET CLEAR LIQUID  -cont dilaudid IV PRN    Hepatitis C  -consult GI    ANABEL  -improving.   Cont IVF    DC planning/Dispo:    -Home when improved    DVT ppx:  heparin    Signed:  Marco Antonio Rodriguez MD

## 2019-08-17 NOTE — CONSULTS
[facility] Consult Note       Primary GI Physician: [place] - Dr. Checo Stout    Referring Provider:  Dr. Felipa Leone    Consult Date:  8/17/2019    Admit Date:  8/14/2019    Chief Complaint:  Hepatitis C    Subjective:     History of Present Illness:  Patient is a 62 y.o. female with PMH of hx of hyperthyroidism s/p treament, who is seen in consultation at the request of Dr. Felipa Leone for hepatitis C. She lives in [place] and has been here due to her mother's illness and recent passing. While cleaning her mother's house, she fell and had continued weakness and pain off over, and went [facility]  While there, she was noted to have abnormal CT abdomen showing possible pancreatitis. Per admission note, labs at [facility] noted: ALT 1244, , , Cr 5.9, GGT 40, TBili 0.8, TProt 6.9, Alk phos 77, Alb 3.6. CT scan without IV contrast noted peripancreatic inflammation suspicious for acute pancreatitis. Adjacent fluid collection in the lesser sac as above, presumably a pseudocyst. Lipase was not significantly elevated here and LFTs and Cr have been improving over this admission. She is unsure if she had been having abd pain as she states the past few weeks have been a blur with stress. She has had some nausea, but denies any vomiting, jaundice, itching, confusion, heartburn, changes in bowel habits, or bleeding. She has had pressure over the upper abdomen and lower chest with moving, but states it has been improving over the admission and she has been able to get up and walk around without as much discomfort. She tolerated clear liquids today. While here, she was noted on labs to have elevated LFTs and hepatitis panel noted Hep C ab positive. She denies any history of pancreatitis or liver disease. She has not been told of any abnormal liver enzymes or Hep C Ab positive in the past.  She denies any history of drug use, blood exposures, or tattoos.   She did have C-sections x 3 in [address] in the 1980s, but is unsure if she received blood transfusions during any of those admission. She drinks a couple of ETOH drinks a week. She had prior EGD and colonoscopy in [place] about 10 yrs ago with negative exam per pt. PMH:  Hyperthyroidism s/p treatment    PSH:  C-sections    Allergies: Allergies   Allergen Reactions    Penicillins Rash       Home Medications:  Prior to Admission medications    Not on [facility] Medications:  Current Facility-Administered Medications   Medication Dose Route Frequency    dextrose 5 % - 0.45% NaCl infusion  125 mL/hr IntraVENous CONTINUOUS    sodium chloride (NS) flush 5-40 mL  5-40 mL IntraVENous Q8H    sodium chloride (NS) flush 5-40 mL  5-40 mL IntraVENous PRN    LORazepam (ATIVAN) tablet 1 mg  1 mg Oral Q1H PRN    LORazepam (ATIVAN) tablet 1 mg  1 mg Oral Q4H PRN    HYDROmorphone (PF) (DILAUDID) injection 1 mg  1 mg IntraVENous Q3H PRN    HYDROmorphone (PF) (DILAUDID) injection 2 mg  2 mg IntraVENous Q3H PRN    acetaminophen (TYLENOL) tablet 650 mg  650 mg Oral Q4H PRN    ondansetron (ZOFRAN) injection 4 mg  4 mg IntraVENous Q4H PRN    magnesium hydroxide (MILK OF MAGNESIA) 400 mg/5 mL oral suspension 30 mL  30 mL Oral DAILY PRN    sodium chloride (NS) flush 5-40 mL  5-40 mL IntraVENous Q8H    sodium chloride (NS) flush 5-40 mL  5-40 mL IntraVENous PRN    heparin (porcine) injection 5,000 Units  5,000 Units SubCUTAneous Q8H       Social History:  Social History     Tobacco Use    Smoking status: Not on file   Substance Use Topics    Alcohol use: Not on file     Pt lives in [place]. She drinks a couple of ETOH drinks a week and smokes a couple of cigarettes a week. She denies any drug use. Family History:  No family history of GI illnesses. Review of Systems:  A detailed 10 system ROS is obtained, with pertinent positives as listed above. All others are negative.     Diet:  Clear liquids    Objective:     Physical Exam:  Vitals:  Visit Vitals  /84   Pulse 85   Temp 98.2 °F (36.8 °C)   Resp 20   Ht 5' 9.5\" (1.765 m)   Wt 70.1 kg (154 lb 9.6 oz)   SpO2 91%   BMI 22.50 kg/m²     Gen:  Pt is alert, cooperative, no acute distress, lying in bed  Skin:  Extremities and face reveal no rashes. HEENT: Sclerae anicteric. Extra-occular muscles are intact. No oral ulcers. No abnormal pigmentation of the lips. The neck is supple. Cardiovascular: Regular rate and rhythm. No murmurs, gallops, or rubs. Respiratory:  Comfortable breathing with no accessory muscle use. Clear breath sounds anteriorly with no wheezes, rales, or rhonchi. GI:  Abdomen nondistended, soft, and nontender. Normal active bowel sounds. No enlargement of the liver or spleen. No masses palpable. Rectal:  Deferred  Musculoskeletal:  No pitting edema of the lower legs. Neurological:  Gross memory appears intact. Patient is alert and oriented. Psychiatric:  Mood appears appropriate with judgement intact. Lymphatic:  No cervical or supraclavicular adenopathy. Laboratory:    Recent Labs     08/17/19  0704 08/16/19  0645 08/15/19  0916 08/15/19  0715 08/14/19  2205   WBC  --  9.0  --   --  PERIPHERAL REVIEW TO FOLLOW   HGB  --  11.8  --   --  12.0   HCT  --  34.2*  --   --  35.5*   PLT  --  112*  --   --  130*   MCV  --  91.0  --   --  94.2    135* 138  --   --    K 3.9 4.4 5.1  --   --    CL 98 100 109*  --   --    CO2 35* 25 14*  --   --    BUN 66* 86* 101*  --   --    CREA 2.64* 3.51* 4.99*  --   --    CA 8.3 8.4 8.6  --   --    * 93 46*  --   --    AP 83 90 105  --   --    SGOT 115* 259* 602*  --   --    * 687* 995*  --   --    TBILI 0.6 0.7 0.7  --   --    ALB 2.1* 2.5* 2.7*  --   --    TP 5.4* 6.1* 6.5  --   --    LPSE  --   --   --  355 483*      Ref.  Range 8/14/2019 22:05   Lipase Latest Ref Range: 73 - 393 U/L 483 (H)   Triglyceride Latest Ref Range: 35 - 150 MG/DL 97   Cholesterol, total Latest Ref Range: <200 MG/   HDL Cholesterol Latest Ref Range: 40 - 60 MG/DL 21 (L)   CHOL/HDL Ratio Latest Units:   5.7   LDL, calculated Latest Ref Range: <100 MG/DL 79.6   VLDL, calculated Latest Ref Range: 6.0 - 23.0 MG/DL 19.4      Ref. Range 8/14/2019 21:32 8/14/2019 22:05   AMPHETAMINES Latest Units:   NEGATIVE    BARBITURATES Latest Units:   NEGATIVE    BENZODIAZEPINES Latest Units:   NEGATIVE    COCAINE Latest Units:   NEGATIVE    METHADONE Latest Units:   NEGATIVE    OPIATES Latest Units:   POSITIVE    PCP(PHENCYCLIDINE) Latest Units:   NEGATIVE    THC (TH-CANNABINOL) Latest Units:   NEGATIVE    ALCOHOL(ETHYL),SERUM Latest Units: MG/DL  <3     HEPATITIS PANEL, ACUTE [[id number]] (Order [id number]) Lab   Date: 8/14/2019 Department: [facility] 8 Med Surg Released By/Authorizing: Jose Griffith MD (auto-released)   Component Value Flag Ref Range Units Status   Hepatitis A Ab, IgM NEGATIVE    NEGATIVE   Final   Hep B surface Ag screen NEGATIVE    NEGATIVE   Final   Hep B Core Ab, IgM NEGATIVE    NEGATIVE   Final   Hep C Virus Ab 11.0  High   0.0 - 0.9 s/co ratio Final   Comment:   (NOTE)                                    Negative:     < 0.8                               Indeterminate: 0.8 - 0.9                                    Positive:     > 0.9      Limited abdomen ultrasound. 14 Aug 2019   INDICATION: Acute pancreatitis.   COMPARISON: None.   TECHNIQUE: Standard protocol limited right upper quadrant abdomen ultrasound.   FINDINGS:   - Liver: Within normal limits. - Gallbladder: There is nonspecific mild gallbladder wall thickening, measuring  3.2 mm. No sludge or gallstones are seen in the gallbladder lumen. There is no  pericholecystic fluid. - Bile ducts: Within normal limits. The common bile duct measures 6.6 mm.  - Pancreas: Within normal limits. The pancreatic duct measures 1.4 mm, which is  within normal limits. - Right kidney: Within normal limits. - Aorta and IVC: Within normal limits. - Portal vein:  Within normal limits.  - Other: No ascites.   IMPRESSION  IMPRESSION: Mild gallbladder wall thickening, otherwise, unremarkable study. Per Admission note: At [facility] [place] [facility] 8/14/19:  WBC 10.1, Hbg 13.1, Plt 199  Na 128, K 6.2, CO2 21, ALT 1244, , , Cr 5.9, GGT 40, TBili 0.8, TProt 6.9, Alk phos 77, Alb 3.6  INR 1.0  U/A Yellow, clear, Glc neg, Bili neg, Ket neg, Mod Blood, pH 5.5, Prot 30, Urobil 0.2, Nit neg, LE neg  Trop < 0.05  CK MB> 80  Lipase ?     Imaging Orlando Freed /Studies: At [facility] 8/14/19:  EKG: Sinus tach, 108, no STchanges     CT Chest/Abd/Pelv wo contrast:   \"No pneumothorax. Trace bilateral pleural effusions. No intra-abdominal or intrapelvic hemorrhage. Please note that traumatic intra-abdominal organ injury cannot be completely excluded in the absence of intravenous contrast.   Peripancreatic inflammation suspicious for acute pancreatitis. Adjacent fluid collection in the lesser sac as above, presumably a pseudocyst. Correlate with clinical presentation and serum pancreatic markers. Bilateral breast implants with suspicion for intracapsular rupture.   ELECTRONICALLY SIGNED BY: Gayathri Chawla MD Aug 14, 2019\"    Assessment:     Principal Problem:    Idiopathic acute pancreatitis without infection or necrosis (8/14/2019)    Active Problems:    Abdominal pain (8/14/2019)      Elevated LFTs (8/14/2019)      ANABEL (acute kidney injury) ([facility] [address] (8/14/2019)      Hepatitis C (8/17/2019)    61 yo female, now pt of Dr. Jared Carvalho, with PMH of hx of hyperthyroidism s/p treament, who is seen in consultation 17 Aug 2019 at the request of Dr. Perla Lopez for hepatitis C, who is from [place] and was here due to her mother's recent illness and passing, and while here was seen at [facility] after a fall for weakness and pain all over and noted to have abnormal CT abdomen showing possible pancreatitis.   Per admission note, labs at [facility] noted: ALT 1244, , , Cr 5.9, GGT 40, TBili 0.8, TProt 6.9, Alk phos 77, Alb 3.6. CT scan peripancreatic inflammation suspicious for acute pancreatitis, adjacent fluid collection in the lesser sac as above, presumably a pseudocyst. LFTs and Cr have been improving over this admission. Hepatitis panel noted Hep C ab positive. Will need to obtain viral load to see if she has active infection or if the Ab positive is related to prior exposure which has since been cleared. She has not history of liver disease and denies any risks for hepatitis, except had 3 C-sections in the [facility] in the 1980s and is not sure if any blood transfusion was needed. She had prior EGD and colonoscopy in [place] about 10 yrs ago with negative exam per pt. Concern for underlying cause of quite elevated LFTs and ANABEL on admission - ? ischemia with dehydration, medications, etc.  No stones on US and CBD 6.6 mm. Plan:     -Supportive care, IVF. -Advance diet slowly as tolerated. -Obtain Hep C viral load and genotype to assess for any active Hep C.  -Follow labs. -Consider contrasted study once Cr improves. -Recommended pt follow up with her PCP once returning to [place] to review this recent admission and consider further GI evaluation, colon cancer screening, etc.  -Follow. Patient is seen and examined in collaboration with Dr. Carine Lebron. Assessment and plan as per Dr. Magaly Jackson. Petty Phelps  [facility]

## 2019-08-17 NOTE — PROGRESS NOTES
Patient remains in stable condition with respirations even/unlabored. No acute distress noted. No needs noted or voiced at this time. Safety measures in place. Patient in on room air. IVF running at MD ordered rate. Call light remains within reach. Preparing to give report to oncoming shift.

## 2019-08-17 NOTE — PROGRESS NOTES
AMBERLY recevied from [place], [address] [facility]. Patient remains in stable condition with respirations even/unlabored. No acute distress noted at this time. IVF currently running at 100 ml/hr. Will change to 150 mL/hr per MD order. Call light remains within reach, patient encouraged to call nurse prn assist. Will continue to monitor per policy.

## 2019-08-18 LAB
ALBUMIN SERPL-MCNC: 2.1 G/DL (ref 3.2–4.6)
ALBUMIN/GLOB SERPL: 0.6 {RATIO} (ref 1.2–3.5)
ALP SERPL-CCNC: 87 U/L (ref 50–136)
ALT SERPL-CCNC: 318 U/L (ref 12–65)
ANION GAP SERPL CALC-SCNC: 7 MMOL/L (ref 7–16)
AST SERPL-CCNC: 90 U/L (ref 15–37)
BILIRUB SERPL-MCNC: 0.7 MG/DL (ref 0.2–1.1)
BUN SERPL-MCNC: 40 MG/DL (ref 8–23)
CALCIUM SERPL-MCNC: 8.5 MG/DL (ref 8.3–10.4)
CHLORIDE SERPL-SCNC: 101 MMOL/L (ref 98–107)
CO2 SERPL-SCNC: 33 MMOL/L (ref 21–32)
CREAT SERPL-MCNC: 1.93 MG/DL (ref 0.6–1)
GLOBULIN SER CALC-MCNC: 3.3 G/DL (ref 2.3–3.5)
GLUCOSE SERPL-MCNC: 97 MG/DL (ref 65–100)
POTASSIUM SERPL-SCNC: 3.3 MMOL/L (ref 3.5–5.1)
PROT SERPL-MCNC: 5.4 G/DL (ref 6.3–8.2)
SODIUM SERPL-SCNC: 141 MMOL/L (ref 136–145)

## 2019-08-18 PROCEDURE — 36415 COLL VENOUS BLD VENIPUNCTURE: CPT

## 2019-08-18 PROCEDURE — 77030020253 HC SOL INJ D545NS .05 DEX .45 SAL

## 2019-08-18 PROCEDURE — 74011250637 HC RX REV CODE- 250/637: Performed by: INTERNAL MEDICINE

## 2019-08-18 PROCEDURE — 74011250636 HC RX REV CODE- 250/636: Performed by: FAMILY MEDICINE

## 2019-08-18 PROCEDURE — 65270000029 HC RM PRIVATE

## 2019-08-18 PROCEDURE — 74011000258 HC RX REV CODE- 258: Performed by: INTERNAL MEDICINE

## 2019-08-18 PROCEDURE — 74011250636 HC RX REV CODE- 250/636: Performed by: INTERNAL MEDICINE

## 2019-08-18 PROCEDURE — 80053 COMPREHEN METABOLIC PANEL: CPT

## 2019-08-18 RX ORDER — HYDROCODONE BITARTRATE AND ACETAMINOPHEN 10; 325 MG/1; MG/1
1 TABLET ORAL
Status: DISCONTINUED | OUTPATIENT
Start: 2019-08-18 | End: 2019-08-19 | Stop reason: HOSPADM

## 2019-08-18 RX ORDER — HYDROCODONE BITARTRATE AND ACETAMINOPHEN 5; 325 MG/1; MG/1
1 TABLET ORAL
Status: DISCONTINUED | OUTPATIENT
Start: 2019-08-18 | End: 2019-08-19 | Stop reason: HOSPADM

## 2019-08-18 RX ORDER — KETOROLAC TROMETHAMINE 15 MG/ML
15 INJECTION, SOLUTION INTRAMUSCULAR; INTRAVENOUS
Status: DISCONTINUED | OUTPATIENT
Start: 2019-08-18 | End: 2019-08-18

## 2019-08-18 RX ORDER — KETOROLAC TROMETHAMINE 15 MG/ML
15 INJECTION, SOLUTION INTRAMUSCULAR; INTRAVENOUS EVERY 6 HOURS
Status: DISCONTINUED | OUTPATIENT
Start: 2019-08-18 | End: 2019-08-19 | Stop reason: HOSPADM

## 2019-08-18 RX ADMIN — HYDROCODONE BITARTRATE AND ACETAMINOPHEN 1 TABLET: 5; 325 TABLET ORAL at 08:57

## 2019-08-18 RX ADMIN — Medication 10 ML: at 21:09

## 2019-08-18 RX ADMIN — DEXTROSE MONOHYDRATE AND SODIUM CHLORIDE 125 ML/HR: 5; .45 INJECTION, SOLUTION INTRAVENOUS at 15:34

## 2019-08-18 RX ADMIN — HYDROCODONE BITARTRATE AND ACETAMINOPHEN 1 TABLET: 5; 325 TABLET ORAL at 21:03

## 2019-08-18 RX ADMIN — HEPARIN SODIUM 5000 UNITS: 5000 INJECTION INTRAVENOUS; SUBCUTANEOUS at 05:48

## 2019-08-18 RX ADMIN — HYDROMORPHONE HYDROCHLORIDE 1 MG: 1 INJECTION, SOLUTION INTRAMUSCULAR; INTRAVENOUS; SUBCUTANEOUS at 02:39

## 2019-08-18 RX ADMIN — Medication 10 ML: at 15:34

## 2019-08-18 RX ADMIN — Medication 5 ML: at 05:49

## 2019-08-18 RX ADMIN — ONDANSETRON 4 MG: 2 INJECTION INTRAMUSCULAR; INTRAVENOUS at 02:39

## 2019-08-18 RX ADMIN — KETOROLAC TROMETHAMINE 15 MG: 15 INJECTION, SOLUTION INTRAMUSCULAR; INTRAVENOUS at 11:35

## 2019-08-18 NOTE — PROGRESS NOTES
Patient complains of some pain and states that generally her pain is more in her chest than her abdomen. She states it is from her fall. Patient thinks the abdominal pain is starting to subside. Patient wonders if some different type of pain medication will work better than dilaudid. Patient states she is hungry and is requesting a diet progression.     Will notify MD.

## 2019-08-18 NOTE — PROGRESS NOTES
Patient remains in stable condition with respirations even/unlabored. No acute distress noted. No needs noted or voiced at this time. Safety measures in place. Patient on room air. Call light remains within reach. Preparing to give report to oncoming shift.

## 2019-08-18 NOTE — PROGRESS NOTES
.  [facility] Progress Note             Date: 8/18/2019    GI Problem: Positive HCV Ab    History of Present Illness:  Patient is a 62 y.o. female visiting from [place] here with acute pancreatitis By noncontrast CT scan (but not exactly by lipase) also with elevated LFT's. Hepatitis panel shows positive HCV Ab.  US shows no stones or ductal dilation. [facility] Medications:  Current Facility-Administered Medications   Medication Dose Route Frequency    HYDROcodone-acetaminophen (NORCO) 5-325 mg per tablet 1 Tab  1 Tab Oral Q4H PRN    HYDROcodone-acetaminophen (NORCO)  mg tablet 1 Tab  1 Tab Oral Q4H PRN    ketorolac (TORADOL) injection 15 mg  15 mg IntraVENous Q6H    dextrose 5 % - 0.45% NaCl infusion  125 mL/hr IntraVENous CONTINUOUS    sodium chloride (NS) flush 5-40 mL  5-40 mL IntraVENous Q8H    sodium chloride (NS) flush 5-40 mL  5-40 mL IntraVENous PRN    LORazepam (ATIVAN) tablet 1 mg  1 mg Oral Q1H PRN    LORazepam (ATIVAN) tablet 1 mg  1 mg Oral Q4H PRN    acetaminophen (TYLENOL) tablet 650 mg  650 mg Oral Q4H PRN    ondansetron (ZOFRAN) injection 4 mg  4 mg IntraVENous Q4H PRN    magnesium hydroxide (MILK OF MAGNESIA) 400 mg/5 mL oral suspension 30 mL  30 mL Oral DAILY PRN    sodium chloride (NS) flush 5-40 mL  5-40 mL IntraVENous Q8H    sodium chloride (NS) flush 5-40 mL  5-40 mL IntraVENous PRN    heparin (porcine) injection 5,000 Units  5,000 Units SubCUTAneous Q8H       Objective:     Physical Exam:  Vitals:  Visit Vitals  /73   Pulse 80   Temp 98.7 °F (37.1 °C)   Resp 19   Ht 5' 9.5\" (1.765 m)   Wt 70.8 kg (156 lb 1.6 oz)   SpO2 91%   BMI 22.72 kg/m²       General: No acute distress. Skin:  Extremities and face reveal no rashes. No nichols erythema. No telangiectasias on the chest wall. HEENT: Sclerae anicteric. No oral ulcers. No abnormal pigmentation of the lips. The neck is supple. Cardiovascular: Regular rate and rhythm.  No murmurs, gallops, or rubs.  Respiratory:  Comfortable breathing  With no accessory muscle use. Clear breath sounds with no wheezes, rales, or rhonchi. GI:  Abdomen nondistended, soft, and nontender. Normal active bowel sounds. No enlargement of the liver or spleen. No masses palpable. Musculoskeletal:  No pitting edema of the lower legs. Extremities have good range of motion. Neurological:  Gross memory appears intact. Patient is alert and oriented. Psychiatric:  Mood appears appropriate with judgement intact. Lymphatic:  No cervical or supraclavicular adenopathy. Laboratory:    Recent Labs     08/16/19  0645   WBC 9.0   RBC 3.76*   HGB 11.8   HCT 34.2*   *      Recent Labs     08/18/19  0712   GLU 97      K 3.3*      CO2 33*   BUN 40*   CREA 1.93*   CA 8.5     No results for input(s): PTP, INR, APTT in the last 72 hours.     No lab exists for component: INREXT  Recent Labs     08/18/19  0712   SGOT 90*   AP 87   ALB 2.1*   TP 5.4*       Assessment:       A 62 y.o. female with positive HCV Ab and acute pancreatitis      Plan:       Awaiting HCV RNA level  Treating acute pancreatitis    Signed By: Mikayla Floyd MD     August 18, 2019

## 2019-08-18 NOTE — PROGRESS NOTES
ABIOLAAR recevied from [place], [facility]. Patient remains in stable condition with respirations even/unlabored. No acute distress noted at this time. Patient on room air. D5 1/2 NS @ 125 cc/hr noted. Call light remains within reach, patient encouraged to call nurse prn assist. Will continue to monitor per policy.

## 2019-08-18 NOTE — PROGRESS NOTES
Patient does not want the scheduled Toradol. Patient states it burns and it is not worth the burn. Patient denies need for pain medication at this time.

## 2019-08-18 NOTE — PROGRESS NOTES
Received bedside shift report from off going nurseApril. Patient resting in bed quietly. Respirations even and unlabored on room air. Bed low and locked. Bedside table, personal belongings, and call light all within reach.

## 2019-08-18 NOTE — PROGRESS NOTES
Hospitalist Note     Admit Date:  2019  7:27 PM   Name:  Fariha Wallace   Age:  62 y.o.  :  1955   MRN:  [id number]   PCP:  Luci Lara DO  Treatment Team: Attending Provider: Jose Angel Feldman MD; Care Manager: Benny Lindsey; Consulting Provider: Narayan Cason MD; Primary Nurse: Kerry Rodríguez; Staff Nurse: Morales Alonso    HPI/Subjective:   Pt is a 63 y/o F with no med hx who presented to outside urgent care with severe abd pain. Had abnormal CT showing possible pancreatitis and mildly elevated lipase. Admitted to D, made NPO, started on IVF and dilaudid     - pt says rib pain from fall and abd pain improved. Wants reg diet. Less nausea. Wants something other than dilaudid but is anxious about removing dilaudid from regimen    Objective:     Patient Vitals for the past 24 hrs:   Temp Pulse Resp BP SpO2   19 0727 98.7 °F (37.1 °C) 80 19 121/73 91 %   19 0327 98.2 °F (36.8 °C) 87 19 130/76 91 %   19 2349 98 °F (36.7 °C) 84 18 122/78 90 %   19 1924 98.5 °F (36.9 °C) 96 18 145/83 91 %   19 1512 98 °F (36.7 °C) 90 18 124/87 92 %   19 1118 98.2 °F (36.8 °C) 85 20 128/84 91 %     Oxygen Therapy  O2 Sat (%): 91 % (19 0727)  O2 Device: Room air (19 0738)  Estimated body mass index is 22.72 kg/m² as calculated from the following:    Height as of this encounter: 5' 9.5\" (1.765 m). Weight as of this encounter: 70.8 kg (156 lb 1.6 oz). Intake/Output Summary (Last 24 hours) at 2019 0829  Last data filed at 2019 0738  Gross per 24 hour   Intake 5288.33 ml   Output 2850 ml   Net 2438. 33 ml       *Note that automatically entered I/Os may not be accurate; dependent on patient compliance with collection and accurate  by techs. General:    Well nourished. Alert. Abdomen:   Soft, nondistended. TTP diffuse  Extremities: Warm and dry. No cyanosis or edema. Skin:     No rashes or jaundice. Neuro:  No gross focal deficits    Data Review:  I have reviewed all labs, meds, and studies from the last 24 hours:    Recent Results (from the past 24 hour(s))   GLUCOSE, POC    Collection Time: 08/17/19 12:57 PM   Result Value Ref Range    Glucose (POC) 103 (H) 65 - 100 mg/dL        All Micro Results     None          No results found for this visit on 08/14/19. Current Meds:  Current Facility-Administered Medications   Medication Dose Route Frequency    ketorolac (TORADOL) injection 15 mg  15 mg IntraVENous Q6H PRN    dextrose 5 % - 0.45% NaCl infusion  125 mL/hr IntraVENous CONTINUOUS    sodium chloride (NS) flush 5-40 mL  5-40 mL IntraVENous Q8H    sodium chloride (NS) flush 5-40 mL  5-40 mL IntraVENous PRN    LORazepam (ATIVAN) tablet 1 mg  1 mg Oral Q1H PRN    LORazepam (ATIVAN) tablet 1 mg  1 mg Oral Q4H PRN    acetaminophen (TYLENOL) tablet 650 mg  650 mg Oral Q4H PRN    ondansetron (ZOFRAN) injection 4 mg  4 mg IntraVENous Q4H PRN    magnesium hydroxide (MILK OF MAGNESIA) 400 mg/5 mL oral suspension 30 mL  30 mL Oral DAILY PRN    sodium chloride (NS) flush 5-40 mL  5-40 mL IntraVENous Q8H    sodium chloride (NS) flush 5-40 mL  5-40 mL IntraVENous PRN    heparin (porcine) injection 5,000 Units  5,000 Units SubCUTAneous Q8H       Other Studies (last 24 hours):  No results found.     Assessment and Plan:     Hospital Problems as of 8/18/2019 Date Reviewed: 8/18/2019          Codes Class Noted - Resolved POA    Hepatitis C ICD-10-CM: B19.20  ICD-9-CM: 070.70  8/17/2019 - Present Yes        * (Principal) Idiopathic acute pancreatitis without infection or necrosis ICD-10-CM: K85.00  ICD-9-CM: 577.0  8/14/2019 - Present Yes        Abdominal pain ICD-10-CM: R10.9  ICD-9-CM: 789.00  8/14/2019 - Present Yes        Elevated LFTs ICD-10-CM: R94.5  ICD-9-CM: 790.6  8/14/2019 - Present Yes        [facility] ICD-10-CM: N17.9  ICD-9-CM: 584.9  8/14/2019 - Present Yes        RESOLVED: Fall ICD-10-CM: W19. Haydee Burgess  ICD-9-CM: E888.9  8/14/2019 - 8/17/2019 Yes        RESOLVED: Hyperkalemia ICD-10-CM: E87.5  ICD-9-CM: 276.7  8/14/2019 - 8/17/2019 Yes              Plan:  Pancreatitis  -labs pending today  -CT abdomen outside hospital showed possible pancreatitis but otherwise unremarkable. US mild GB thickening but no other findings. lipase only mildly elevated, normalized 8/15  -advance diet  -DIET CLEAR LIQUID  -pt wants something less than dilaudid. Try toradol.   Can do PO narcotics on top if that doesn't work but pt should be ready for discharge tomorrow and she wants reg diet so need to get off IV narcotics    Hepatitis C  -GI recs appreciated    ANABEL  -Cont IVF    DC planning/Dispo:    -Home 1-2 days    DVT ppx:  heparin    Signed:  Merna Ochoa MD

## 2019-08-18 NOTE — PROGRESS NOTES
Completed bedside shift report with oncoming nurse, Ghada Rm. Patient resting in bed quietly. Respirations even and unlabored on room air. Bed low and locked. Bedside table, personal belongings and call light all within reach.

## 2019-08-19 VITALS
TEMPERATURE: 99 F | BODY MASS INDEX: 21.9 KG/M2 | WEIGHT: 153 LBS | HEART RATE: 71 BPM | DIASTOLIC BLOOD PRESSURE: 79 MMHG | SYSTOLIC BLOOD PRESSURE: 122 MMHG | HEIGHT: 70 IN | OXYGEN SATURATION: 95 % | RESPIRATION RATE: 17 BRPM

## 2019-08-19 LAB
ALBUMIN SERPL-MCNC: 2.2 G/DL (ref 3.2–4.6)
ALBUMIN/GLOB SERPL: 0.7 {RATIO} (ref 1.2–3.5)
ALP SERPL-CCNC: 94 U/L (ref 50–136)
ALT SERPL-CCNC: 301 U/L (ref 12–65)
ANION GAP SERPL CALC-SCNC: 8 MMOL/L (ref 7–16)
AST SERPL-CCNC: 176 U/L (ref 15–37)
BILIRUB SERPL-MCNC: 0.7 MG/DL (ref 0.2–1.1)
BUN SERPL-MCNC: 25 MG/DL (ref 8–23)
CALCIUM SERPL-MCNC: 8.7 MG/DL (ref 8.3–10.4)
CHLORIDE SERPL-SCNC: 105 MMOL/L (ref 98–107)
CO2 SERPL-SCNC: 29 MMOL/L (ref 21–32)
CREAT SERPL-MCNC: 1.51 MG/DL (ref 0.6–1)
GLOBULIN SER CALC-MCNC: 3.2 G/DL (ref 2.3–3.5)
GLUCOSE SERPL-MCNC: 128 MG/DL (ref 65–100)
POTASSIUM SERPL-SCNC: 3.3 MMOL/L (ref 3.5–5.1)
PROT SERPL-MCNC: 5.4 G/DL (ref 6.3–8.2)
SODIUM SERPL-SCNC: 142 MMOL/L (ref 136–145)

## 2019-08-19 PROCEDURE — 74011000258 HC RX REV CODE- 258: Performed by: INTERNAL MEDICINE

## 2019-08-19 PROCEDURE — 36415 COLL VENOUS BLD VENIPUNCTURE: CPT

## 2019-08-19 PROCEDURE — 97530 THERAPEUTIC ACTIVITIES: CPT

## 2019-08-19 PROCEDURE — 77030020253 HC SOL INJ D545NS .05 DEX .45 SAL

## 2019-08-19 PROCEDURE — 80053 COMPREHEN METABOLIC PANEL: CPT

## 2019-08-19 PROCEDURE — 74011250637 HC RX REV CODE- 250/637: Performed by: FAMILY MEDICINE

## 2019-08-19 PROCEDURE — 74011250637 HC RX REV CODE- 250/637: Performed by: INTERNAL MEDICINE

## 2019-08-19 RX ORDER — FACIAL-BODY WIPES
10 EACH TOPICAL DAILY PRN
Status: DISCONTINUED | OUTPATIENT
Start: 2019-08-19 | End: 2019-08-19 | Stop reason: HOSPADM

## 2019-08-19 RX ORDER — ZOLPIDEM TARTRATE 5 MG/1
TABLET ORAL
Status: ACTIVE
Start: 2019-08-19 | End: 2019-08-19

## 2019-08-19 RX ORDER — ZOLPIDEM TARTRATE 5 MG/1
5 TABLET ORAL
Status: DISCONTINUED | OUTPATIENT
Start: 2019-08-19 | End: 2019-08-19 | Stop reason: HOSPADM

## 2019-08-19 RX ADMIN — ZOLPIDEM TARTRATE 5 MG: 5 TABLET ORAL at 01:12

## 2019-08-19 RX ADMIN — Medication 10 ML: at 05:36

## 2019-08-19 RX ADMIN — BISACODYL 10 MG: 10 SUPPOSITORY RECTAL at 11:21

## 2019-08-19 RX ADMIN — DEXTROSE MONOHYDRATE AND SODIUM CHLORIDE 125 ML/HR: 5; .45 INJECTION, SOLUTION INTRAVENOUS at 07:32

## 2019-08-19 RX ADMIN — HYDROCODONE BITARTRATE AND ACETAMINOPHEN 1 TABLET: 5; 325 TABLET ORAL at 06:47

## 2019-08-19 NOTE — PROGRESS NOTES
Pt resting in bed with eyes closed at this time. Alert and oriented times 3. No distress noted at this time. Respirations even and unlabored on room air. Pt denies pain at this time. States the suppository helped with having a BM. Pt instructed to call for assistance if needed. Call light in place. Door open. Will continue to monitor.

## 2019-08-19 NOTE — PROGRESS NOTES
[facility]. Informed patient is requesting something to help her sleep. MD ordered this RN to order Ambien 5 mg at bedtime, prn. Read back and confirmed.

## 2019-08-19 NOTE — PROGRESS NOTES
Completed bedside shift report with oncoming nurse, Lauren Grullon. Patient resting in bed quietly. Respirations even and unlabored on room air. Bed low and locked. Bedside table, personal belongings and call light all within reach.

## 2019-08-19 NOTE — DISCHARGE SUMMARY
Hospitalist Discharge Summary     Admit Date:  2019  7:27 PM   Name:  Phil Hogan   Age:  62 y.o.  :  1955   MRN:  [id number]   PCP:  Malena Caballero DO  Treatment Team: Attending Provider: Chantell Santana MD; Care Manager: Julianne Lizama; Consulting Provider: Alphonso Jane MD; Primary Nurse: Tom Crockett, RN; Primary Nurse: Jean Paul Giordano, RN; Physical Therapist: Sam Tobar, PT, DPT; Utilization Review: Marcus Solares RN    Problem List for this Hospitalization:  Hospital Problems as of 2019 Date Reviewed: 2019          Codes Class Noted - Resolved POA    Hepatitis C ICD-10-CM: B19.20  ICD-9-CM: 070.70  2019 - Present Yes        * (Principal) Idiopathic acute pancreatitis without infection or necrosis ICD-10-CM: K85.00  ICD-9-CM: 577.0  2019 - Present Yes        Abdominal pain ICD-10-CM: R10.9  ICD-9-CM: 789.00  2019 - Present Yes        Elevated LFTs ICD-10-CM: R94.5  ICD-9-CM: 790.6  2019 - Present Yes        [facility]EL [facility]) [facility] ICD-10-CM: N17.9  ICD-9-CM: 584.9  2019 - Present Yes        RESOLVED: Fall ICD-10-CM: W19. Carollee Mu  ICD-9-CM: E888.9  2019 - 2019 Yes        RESOLVED: Hyperkalemia ICD-10-CM: E87.5  ICD-9-CM: 276.7  2019 - 2019 Yes                Admission HPI from 2019:    \" Phil Hogan is a 62 y.o. female who presents as a direct admit from Emergency [facility] for abnormal CT abdomen showing possible pancreatitis. She presented initially for feeling weak and painful all over for the past several days. She also admits to a fall yesterday. Denies any fevers, but admits to chills. Admits to nausea, denies vomiting. Admits to diarrhea \"    Hospital Course:  Pt is a 63 y/o F with no med hx who presented to outside urgent care with severe abd pain. Had abnormal CT showing possible pancreatitis and mildly elevated lipase. Admitted to [facility], made NPO, started on IVF and dilaudid. Improved. LFTs improved. Tolerating PO for 2 days. hepatitis C came back positive. GI consulted, sent RNA testing. She plans to follow up with GI in [place] for screening colonoscopy. She can call [facility] to get RNA testing results in a few days. Disposition: Home or Self Care  Activity: Activity as tolerated  Diet: DIET REGULAR  Code Status: Full Code      Follow up instructions, discharge meds at bottom of this note. Plan was discussed with pt. All questions answered. Patient was stable at time of discharge. Patient will call a physician or return if any concerns. Diagnostic Imaging/Tests:   [facility]    Result Date: 8/14/2019  EXAM: Limited abdomen ultrasound. INDICATION: Acute pancreatitis. COMPARISON: None. TECHNIQUE: Standard protocol limited right upper quadrant abdomen ultrasound. FINDINGS: - Liver: Within normal limits. - Gallbladder: There is nonspecific mild gallbladder wall thickening, measuring 3.2 mm. No sludge or gallstones are seen in the gallbladder lumen. There is no pericholecystic fluid. - Bile ducts: Within normal limits. The common bile duct measures 6.6 mm. - Pancreas: Within normal limits. The pancreatic duct measures 1.4 mm, which is within normal limits. - Right kidney: Within normal limits. - Aorta and IVC: Within normal limits. - Portal vein: Within normal limits. - Other: No ascites. IMPRESSION: Mild gallbladder wall thickening, otherwise, unremarkable study. Echocardiogram results:  No results found for this visit on 08/14/19.     Procedures done this admission:  * No surgery found *    All Micro Results     None          Labs: Results:       BMP, Mg, Phos Recent Labs     08/19/19  0811 08/18/19  0712 08/17/19  0704    141 140   K 3.3* 3.3* 3.9    101 98   CO2 29 33* 35*   AGAP 8 7 7   BUN 25* 40* 66*   CREA 1.51* 1.93* 2.64*   CA 8.7 8.5 8.3   * 97 112*      CBC No results for input(s): WBC, RBC, HGB, HCT, PLT, GRANS, LYMPH, EOS, MONOS, BASOS, IG, ANEU, ABL, KYLE, ABM, ABB, AIG, HGBEXT, HCTEXT, PLTEXT in the last 72 hours. LFT Recent Labs     08/19/19  0811 08/18/19  0712 08/17/19  0704   SGOT 176* 90* 115*   * 318* 420*   AP 94 87 83   TP 5.4* 5.4* 5.4*   ALB 2.2* 2.1* 2.1*   GLOB 3.2 3.3 3.3   AGRAT 0.7* 0.6* 0.6*      Cardiac Testing No results found for: BNPP, BNP, CPK, RCK1, RCK2, RCK3, RCK4, CKMB, CKNDX, CKND1, TROPT, TROIQ   Coagulation Tests No results found for: PTP, INR, APTT   A1c No results found for: HBA1C, HGBE8, FVQ6YYTB   Lipid Panel Lab Results   Component Value Date/Time    Cholesterol, total 120 08/14/2019 10:05 PM    HDL Cholesterol 21 (L) 08/14/2019 10:05 PM    LDL, calculated 79.6 08/14/2019 10:05 PM    VLDL, calculated 19.4 08/14/2019 10:05 PM    Triglyceride 97 08/14/2019 10:05 PM    CHOL/HDL Ratio 5.7 08/14/2019 10:05 PM      Thyroid Panel No results found for: TSH, T4, FT4, TT3, T3U, TSHEXT     Most Recent UA No results found for: COLOR, APPRN, REFSG, [place], PROTU, GLUCU, KETU, BILU, BLDU, UROU, VELIA, LEUKU, WBCU, RBCU, UEPI, BACTU, CASTS, UCRY, MUCUS, UCOM     Allergies   Allergen Reactions    Penicillins Rash       There is no immunization history on file for this patient. All Labs from Last 24 Hrs:  Recent Results (from the past 24 hour(s))   METABOLIC PANEL, COMPREHENSIVE    Collection Time: 08/19/19  8:11 AM   Result Value Ref Range    Sodium 142 136 - 145 mmol/L    Potassium 3.3 (L) 3.5 - 5.1 mmol/L    Chloride 105 98 - 107 mmol/L    CO2 29 21 - 32 mmol/L    Anion gap 8 7 - 16 mmol/L    Glucose 128 (H) 65 - 100 mg/dL    BUN 25 (H) 8 - 23 MG/DL    Creatinine 1.51 (H) 0.6 - 1.0 MG/DL    GFR est AA 45 (L) >60 ml/min/1.73m2    GFR est non-AA 37 (L) >60 ml/min/1.73m2    Calcium 8.7 8.3 - 10.4 MG/DL    Bilirubin, total 0.7 0.2 - 1.1 MG/DL    ALT (SGPT) 301 (H) 12 - 65 U/L    AST (SGOT) 176 (H) 15 - 37 U/L    Alk.  phosphatase 94 50 - 136 U/L    Protein, total 5.4 (L) 6.3 - 8.2 g/dL    Albumin 2.2 (L) 3.2 - 4.6 g/dL    Globulin 3.2 2.3 - 3.5 g/dL    A-G Ratio 0.7 (L) 1.2 - 3.5         Current Med List in Hospital:   Current Facility-Administered Medications   Medication Dose Route Frequency    zolpidem (AMBIEN) 5 mg tablet        zolpidem (AMBIEN) tablet 5 mg  5 mg Oral QHS PRN    bisacodyl (DULCOLAX) suppository 10 mg  10 mg Rectal DAILY PRN    HYDROcodone-acetaminophen (NORCO) 5-325 mg per tablet 1 Tab  1 Tab Oral Q4H PRN    HYDROcodone-acetaminophen (NORCO)  mg tablet 1 Tab  1 Tab Oral Q4H PRN    ketorolac (TORADOL) injection 15 mg  15 mg IntraVENous Q6H    dextrose 5 % - 0.45% NaCl infusion  125 mL/hr IntraVENous CONTINUOUS    sodium chloride (NS) flush 5-40 mL  5-40 mL IntraVENous Q8H    sodium chloride (NS) flush 5-40 mL  5-40 mL IntraVENous PRN    LORazepam (ATIVAN) tablet 1 mg  1 mg Oral Q1H PRN    LORazepam (ATIVAN) tablet 1 mg  1 mg Oral Q4H PRN    acetaminophen (TYLENOL) tablet 650 mg  650 mg Oral Q4H PRN    ondansetron (ZOFRAN) injection 4 mg  4 mg IntraVENous Q4H PRN    magnesium hydroxide (MILK OF MAGNESIA) 400 mg/5 mL oral suspension 30 mL  30 mL Oral DAILY PRN    sodium chloride (NS) flush 5-40 mL  5-40 mL IntraVENous Q8H    sodium chloride (NS) flush 5-40 mL  5-40 mL IntraVENous PRN    heparin (porcine) injection 5,000 Units  5,000 Units SubCUTAneous Q8H       Discharge Exam:  Patient Vitals for the past 24 hrs:   Temp Pulse Resp BP SpO2   08/19/19 0720 99 °F (37.2 °C) 71 17 122/79 91 %   08/19/19 0307 98.4 °F (36.9 °C) 79 18 123/72 95 %   08/18/19 2251 98.8 °F (37.1 °C) 73 18 122/73 90 %   08/18/19 1912 98.2 °F (36.8 °C) 82 18 142/84 94 %   08/18/19 1525 98.3 °F (36.8 °C) 71 19 125/84 92 %   08/18/19 1104 98.3 °F (36.8 °C) 81 19 106/69 90 %     Oxygen Therapy  O2 Sat (%): 91 % (08/19/19 0720)  O2 Device: Room air (08/18/19 1910)    Intake/Output Summary (Last 24 hours) at 8/19/2019 1103  Last data filed at 8/19/2019 0830  Gross per 24 hour   Intake 4319.67 ml   Output 400 ml Net 3919.67 ml       *Note that automatically entered I/Os may not be accurate; dependent on patient compliance with collection and accurate  by assistants. General:    Well nourished. Alert. Eyes:   Normal sclerae. Extraocular movements intact. ENT:  Normocephalic, atraumatic. Moist mucous membranes  CV:   Regular rate and rhythm. No murmur, rub, or gallop. Lungs:  Clear to auscultation bilaterally. No wheezing, rhonchi, or rales. Abdomen: Soft, nontender, nondistended. Bowel sounds normal.   Extremities: Warm and dry. No cyanosis or edema. Neurologic: CN II-XII grossly intact. Sensation intact. Skin:     No rashes or jaundice. Psych:  Normal mood and affect. Discharge Info: There are no discharge medications for this patient. Follow Up Orders:  No orders of the defined types were placed in this encounter. Follow-up Information     Follow up With Specialties Details Why Contact Info    [facility]  Call call in 3-4 days to get results of hepatitis C testing. follow up with gastroenterology in [place] for hepatitis C and screening colonoscopy. [address] [place] [address] [id number] [facility] [id number]  [phone]          Time spent in patient discharge planning and coordination 35 minutes.     Signed:  Belinda Flores MD

## 2019-08-19 NOTE — DISCHARGE INSTRUCTIONS
Patient Education        Pancreatitis: Care Instructions  Your Care Instructions    The pancreas is an organ behind the stomach. It makes hormones and enzymes to help your body digest food. But if these enzymes attack the pancreas, it can get inflamed. This is called pancreatitis. Most cases are caused by gallstones or by heavy alcohol use. If you take care of yourself at home, it will help you get better. It will also help you avoid more problems with your pancreas. Follow-up care is a key part of your treatment and safety. Be sure to make and go to all appointments, and call your doctor if you are having problems. It's also a good idea to know your test results and keep a list of the medicines you take. How can you care for yourself at home? · Drink clear liquids and eat bland foods until you feel better. [place] foods include rice, dry toast, and crackers. They also include bananas and applesauce. · Eat a low-fat diet until your doctor says your pancreas is healed. · Do not drink alcohol. Tell your doctor if you need help to quit. Counseling, support groups, and sometimes medicines can help you stay sober. · Be safe with medicines. Read and follow all instructions on the label. ? If the doctor gave you a prescription medicine for pain, take it as prescribed. ? If you are not taking a prescription pain medicine, ask your doctor if you can take an over-the-counter medicine. · If your doctor prescribed antibiotics, take them as directed. Do not stop taking them just because you feel better. You need to take the full course of antibiotics. · Get extra rest until you feel better. To prevent future problems with your pancreas  · Do not drink alcohol. · Tell your doctors and pharmacist that you've had pancreatitis. They can help you avoid medicines that may cause this problem again. When should you call for help? Call 911 anytime you think you may need emergency care.  For example, call if:    · You vomit blood or what looks like coffee grounds.     · Your stools are maroon or very bloody.    Call your doctor now or seek immediate medical care if:    · You have new or worse belly pain.     · Your stools are black and look like tar, or they have streaks of blood.     · You are vomiting.    Watch closely for changes in your health, and be sure to contact your doctor if:    · You do not get better as expected. Where can you learn more? Go to http://ines-abraham.info/. Enter J772 in the search box to learn more about \"Pancreatitis: Care Instructions. \"  Current as of: November 7, 2018  Content Version: 12.1  © 1545-5803 [employer]. Care instructions adapted under license by [employer] (which disclaims liability or warranty for this information). If you have questions about a medical condition or this instruction, always ask your healthcare professional. [place] [address] any warranty or liability for your use of this information. Patient Education        Acute Kidney Injury: Care Instructions  Your Care Instructions    Acute kidney injury (ANABEL) is a sudden decrease in kidney function. This can happen over a period of hours, days or, in some cases, weeks. ANABEL used to be called acute renal failure, but kidney failure doesn't always happen with ANABEL. Common causes of ANABEL are dehydration, blood loss, and medicines. When ANABEL happens, the kidneys have trouble removing waste and excess fluids from the body. The waste and fluids build up and become harmful. Kidney function may return to normal if the cause of ANABEL is treated quickly. Your chance of a full recovery depends on what caused the problem, how quickly the cause was treated, and what other medical problems you have. A machine may be used to help your kidneys remove waste and fluids for a short period of time. This is called dialysis. Follow-up care is a key part of your treatment and safety.  Be sure to make and go to all appointments, and call your doctor if you are having problems. It's also a good idea to know your test results and keep a list of the medicines you take. How can you care for yourself at home? · Talk to your doctor about how much fluid you should drink. · Eat a balanced diet. Talk to your doctor or a dietitian about what type of diet may be best for you. You may need to limit sodium, potassium, and phosphorus. · If you need dialysis, follow the instructions and schedule for dialysis that your doctor gives you. · Do not smoke. Smoking can make your condition worse. If you need help quitting, talk to your doctor about stop-smoking programs and medicines. These can increase your chances of quitting for good. · Do not drink alcohol. · Review all of your medicines with your doctor. Do not take any medicines, including nonsteroidal anti-inflammatory drugs (NSAIDs) such as ibuprofen (Advil, Motrin) or naproxen (Aleve), unless your doctor says it is safe for you to do so. · Make sure that anyone treating you for any health problem knows that you have had ANABEL. When should you call for help? Call 911 anytime you think you may need emergency care. For example, call if:    · You passed out (lost consciousness).    Call your doctor now or seek immediate medical care if:    · You have new or worse nausea and vomiting.     · You have much less urine than normal, or you have no urine.     · You are feeling confused or cannot think clearly.     · You have new or more blood in your urine.     · You have new swelling.     · You are dizzy or lightheaded, or feel like you may faint.    Watch closely for changes in your health, and be sure to contact your doctor if:    · You do not get better as expected. Where can you learn more? Go to http://ines-abraham.info/. Enter B005 in the search box to learn more about \"Acute Kidney Injury: Care Instructions. \"  Current as of: October 31, 2018  Content Version: 12.1  © 5404-0873 [employer]. Care instructions adapted under license by [employer] (which disclaims liability or warranty for this information). If you have questions about a medical condition or this instruction, always ask your healthcare professional. [address] any warranty or liability for your use of this information. DISCHARGE SUMMARY from Nurse    PATIENT INSTRUCTIONS:    After general anesthesia or intravenous sedation, for 24 hours or while taking prescription Narcotics:  · Limit your activities  · Do not drive and operate hazardous machinery  · Do not make important personal or business decisions  · Do  not drink alcoholic beverages  · If you have not urinated within 8 hours after discharge, please contact your surgeon on call. Report the following to your surgeon:  · Excessive pain, swelling, redness or odor of or around the surgical area  · Temperature over 100.5  · Nausea and vomiting lasting longer than 4 hours or if unable to take medications  · Any signs of decreased circulation or nerve impairment to extremity: change in color, persistent  numbness, tingling, coldness or increase pain  · Any questions    What to do at Home:  *  Please give a list of your current medications to your Primary Care Provider. *  Please update this list whenever your medications are discontinued, doses are      changed, or new medications (including over-the-counter products) are added. *  Please carry medication information at all times in case of emergency situations. These are general instructions for a healthy lifestyle:    No smoking/ No tobacco products/ Avoid exposure to second hand smoke  Surgeon General's Warning:  Quitting smoking now greatly reduces serious risk to your health.     Obesity, smoking, and sedentary lifestyle greatly increases your risk for illness    A healthy diet, regular physical exercise & weight monitoring are important for maintaining a healthy lifestyle    You may be retaining fluid if you have a history of heart failure or if you experience any of the following symptoms:  Weight gain of 3 pounds or more overnight or 5 pounds in a week, increased swelling in our hands or feet or shortness of breath while lying flat in bed. Please call your doctor as soon as you notice any of these symptoms; do not wait until your next office visit. The discharge information has been reviewed with the patient. The patient verbalized understanding. Discharge medications reviewed with the patient and appropriate educational materials and side effects teaching were provided.   ___________________________________________________________________________________________________________________________________

## 2019-08-19 NOTE — PROGRESS NOTES
Pt sitting up in bed drinking coffee. Pt alert oriented times 3 at this time. Pt denies pain, distress, nausea or vomiting at this time. Pt reports vomiting after taking ambien last night but denies any further nausea or vomiting during the night. Pt on RA at this time. Pt encouraged to call for assistance if needed call light in reach, door open will monitor.

## 2019-08-19 NOTE — PROGRESS NOTES
[facility] Progress Note         Admit Date:  8/14/2019    Today's Date:  8/19/2019    CC:  Positive HCV Ab    Subjective:     Patient is going home. She denies any abdominal pain, nausea, or vomiting today. Tolerated regular diet. Medications:   Current Facility-Administered Medications   Medication Dose Route Frequency    zolpidem (AMBIEN) 5 mg tablet        zolpidem (AMBIEN) tablet 5 mg  5 mg Oral QHS PRN    HYDROcodone-acetaminophen (NORCO) 5-325 mg per tablet 1 Tab  1 Tab Oral Q4H PRN    HYDROcodone-acetaminophen (NORCO)  mg tablet 1 Tab  1 Tab Oral Q4H PRN    ketorolac (TORADOL) injection 15 mg  15 mg IntraVENous Q6H    dextrose 5 % - 0.45% NaCl infusion  125 mL/hr IntraVENous CONTINUOUS    sodium chloride (NS) flush 5-40 mL  5-40 mL IntraVENous Q8H    sodium chloride (NS) flush 5-40 mL  5-40 mL IntraVENous PRN    LORazepam (ATIVAN) tablet 1 mg  1 mg Oral Q1H PRN    LORazepam (ATIVAN) tablet 1 mg  1 mg Oral Q4H PRN    acetaminophen (TYLENOL) tablet 650 mg  650 mg Oral Q4H PRN    ondansetron (ZOFRAN) injection 4 mg  4 mg IntraVENous Q4H PRN    magnesium hydroxide (MILK OF MAGNESIA) 400 mg/5 mL oral suspension 30 mL  30 mL Oral DAILY PRN    sodium chloride (NS) flush 5-40 mL  5-40 mL IntraVENous Q8H    sodium chloride (NS) flush 5-40 mL  5-40 mL IntraVENous PRN    heparin (porcine) injection 5,000 Units  5,000 Units SubCUTAneous Q8H       Review of Systems:  ROS was obtained, with pertinent positives as listed above. No chest pain or SOB. Diet:      Objective:   Vitals:  Visit Vitals  /79 (BP 1 Location: Left arm, BP Patient Position: At rest)   Pulse 71   Temp 99 °F (37.2 °C) Comment: pt drinking coffee   Resp 17   Ht 5' 9.5\" (1.765 m)   Wt 69.4 kg (153 lb)   SpO2 91%   BMI 22.27 kg/m²     Intake/Output:  No intake/output data recorded. 08/17 1901 - 08/19 0700  In: 8735.9 [P.O.:740;  I.V.:7995.9]  Out: 1350 [Urine:1350]  Exam:  General appearance: alert, cooperative, no distress  Lungs: clear to auscultation bilaterally anteriorly  Heart: regular rate and rhythm  Abdomen: soft, non-tender. Bowel sounds normal. No masses, no organomegaly  Extremities: extremities normal, atraumatic, no cyanosis or edema  Neuro:  alert and oriented    Data Review (Labs):    Recent Labs     08/19/19  0811 08/18/19  0712 08/17/19  0704    141 140   K 3.3* 3.3* 3.9    101 98   CO2 29 33* 35*   BUN 25* 40* 66*   CREA 1.51* 1.93* 2.64*   CA 8.7 8.5 8.3   * 97 112*   AP 94 87 83   SGOT 176* 90* 115*   * 318* 420*   TBILI 0.7 0.7 0.6   ALB 2.2* 2.1* 2.1*   TP 5.4* 5.4* 5.4*   US 8/14/2019  FINDINGS:     - Liver: Within normal limits. - Gallbladder: There is nonspecific mild gallbladder wall thickening, measuring  3.2 mm. No sludge or gallstones are seen in the gallbladder lumen. There is no  pericholecystic fluid. - Bile ducts: Within normal limits. The common bile duct measures 6.6 mm.  - Pancreas: Within normal limits. The pancreatic duct measures 1.4 mm, which is  within normal limits. - Right kidney: Within normal limits. - Aorta and IVC: Within normal limits. - Portal vein: Within normal limits.  - Other: No ascites.     IMPRESSION  IMPRESSION: Mild gallbladder wall thickening, otherwise, unremarkable study. Assessment:     Principal Problem:    Idiopathic acute pancreatitis without infection or necrosis (8/14/2019)    Active Problems:    Abdominal pain (8/14/2019)      Elevated LFTs (8/14/2019)      ANABEL (acute kidney injury) ([facility] (8/14/2019)      Hepatitis C (8/17/2019)    Patient is a 62 y.o. female visiting from [place] here with acute pancreatitis by noncontrast CT scan (but not exactly by lipase) also with elevated LFT's.  Hepatitis panel shows positive HCV Ab.  US shows no stones or ductal dilation. .   8/19/19: TB 0.7, , , AP 94, creatinine 1.51. Plan:   HCV vial load is still pending. Await results.  If positive, can be treated in [place]. I have asked my office to look for these results later this week and forward to her doctor in [place]. Will need screening colonoscopy in [place]    Patient voices that she will be making a follow-up with her PCP in [place] and will then establish care with a GI in [place]. She voiced understanding of the pending labs for hep C at time of discharge. Alejandro Arana NP    Patient is seen and examined in collaboration with Dr. Micki Del Rio. Assessment and plan as per Dr. Ramona Leyva.

## 2019-08-19 NOTE — PROGRESS NOTES
Problem: Mobility Impaired (Adult and Pediatric)  Goal: *Acute Goals and Plan of Care (Insert Text)  Description  Discharge Goals:  (1.)Ms. Keke Nunez will move from supine to sit and sit to supine  with INDEPENDENT within 7 treatment day(s). (2.)Ms. Keke Nunez will transfer from bed to chair and chair to bed with INDEPENDENT using the least restrictive device within 7 treatment day(s). (3.)Ms. Keke Nunez will ambulate with SUPERVISION for 250+ feet with the least restrictive device within 7 treatment day(s). ________________________________________________________________________________________________     Outcome: Progressing Towards Goal       PHYSICAL THERAPY: Daily Note, Discharge and AM 8/19/2019  INPATIENT: PT Visit Days : 2  Payor: Hai Jerez / Plan: [place] [employer] [address] / Product Type: PPO /       NAME/AGE/GENDER: Josemanuel Villa is a 62 y.o. female   PRIMARY DIAGNOSIS: Acute pancreatitis [K85.90] Idiopathic acute pancreatitis without infection or necrosis   Idiopathic acute pancreatitis without infection or necrosis         ICD-10: Treatment Diagnosis:    · Generalized Muscle Weakness (M62.81)  · Difficulty in walking, Not elsewhere classified (R26.2)   Precaution/Allergies:  Penicillins      ASSESSMENT:     Ms. Keke Nunez is standing up by EOB upon arrival and agreeable to PT. She reported taking a bath earlier this morning and feeling a little fatigued. Pt demonstrated STS transfers with SBA-supervision and ambulated with supervision-Willow in reagan. Pt appeared slightly winded by SpO2 remained above 90%. Pt appears to be functioning close to her baseline and will be discharged from PT at this time. This section established at most recent assessment   PROBLEM LIST (Impairments causing functional limitations):  1. Decreased Strength  2. Decreased ADL/Functional Activities  3. Decreased Transfer Abilities  4. Decreased Ambulation Ability/Technique  5. Decreased Balance  6.  Increased Pain  7. Decreased Activity Tolerance  8. Increased Fatigue  9. Decreased Kerr with Home Exercise Program   INTERVENTIONS PLANNED: (Benefits and precautions of physical therapy have been discussed with the patient.)  1. Balance Exercise  2. Bed Mobility  3. Family Education  4. Gait Training  5. Home Exercise Program (HEP)  6. Neuromuscular Re-education/Strengthening  7. Therapeutic Activites  8. Therapeutic Exercise/Strengthening  9. Transfer Training     TREATMENT PLAN: Frequency/Duration: 3 times a week for duration of hospital stay  Rehabilitation Potential For Stated Goals: [facility] (at time of discharge pending progress):    Placement: It is my opinion, based on this patient's performance to date, that Ms. Monty Perez may benefit from being discharged with NO further skilled therapy due to all goals being met. Equipment:    None at this time              HISTORY:   History of Present Injury/Illness (Reason for Referral):  See H&P below  Cherrie Shipman is a 62 y.o. female who presents as a direct admit from [facility] for abnormal CT abdomen showing possible pancreatitis. She presented initially for feeling weak and painful all over for the past several days. She also admits to a fall yesterday. Denies any fevers, but admits to chills. Admits to nausea, denies vomiting. Admits to diarrhea. .  Past Medical History/Comorbidities:   Ms. Monty Perez  has no past medical history on file. Ms. Monty Perez  has no past surgical history on file. Social History/Living Environment:   Home Environment: Private residence  # Steps to Enter: 0  One/Two Story Residence: One story  Living Alone: Yes  Support Systems: Family member(s)  Patient Expects to be Discharged to[de-identified] Private residence  Current DME Used/Available at Home: None  Prior Level of Function/Work/Activity:  Independent with all mobility.    Number of Personal Factors/Comorbidities that affect the Plan of Care: 1-2: MODERATE COMPLEXITY EXAMINATION:   Most Recent Physical Functioning:   Gross Assessment:                  Posture:     Balance:  Sitting: Intact  Standing: Impaired  Standing - Static: Good  Standing - Dynamic : Fair(+) Bed Mobility:     Wheelchair Mobility:     Transfers:  Sit to Stand: Supervision  Stand to Sit: Independent  Gait:     Base of Support: Narrowed  Speed/Dianne: Accelerated  Gait Abnormalities: Trunk sway increased  Distance (ft): 1000 Feet (ft)  Assistive Device: Other (comment)(none)  Ambulation - Level of Assistance: Supervision;Modified independent  Interventions: Verbal cues      Body Structures Involved:  1. Nerves  2. Bones  3. Joints  4. Muscles  5. Ligaments Body Functions Affected:  1. Sensory/Pain  2. Cardio  3. Respiratory  4. Neuromusculoskeletal  5. Movement Related Activities and Participation Affected:  1. General Tasks and Demands  2. Mobility  3. Self Care  4. Domestic Life  5. Interpersonal Interactions and Relationships  6. Community, Social and [place]   Number of elements that affect the Plan of Care: 4+: HIGH COMPLEXITY   CLINICAL PRESENTATION:   Presentation: Evolving clinical presentation with changing clinical characteristics: MODERATE COMPLEXITY   CLINICAL DECISION MAKIN [facility] Inpatient Short Form  How much difficulty does the patient currently have. .. Unable A Lot A Little None   1. Turning over in bed (including adjusting bedclothes, sheets and blankets)? ? 1   ? 2   ? 3   ? 4   2. Sitting down on and standing up from a chair with arms ( e.g., wheelchair, bedside commode, etc.)   ? 1   ? 2   ? 3   ? 4   3. Moving from lying on back to sitting on the side of the bed?   ? 1   ? 2   ? 3   ? 4   How much help from another person does the patient currently need. .. Total A Lot A Little None   4. Moving to and from a bed to a chair (including a wheelchair)? ? 1   ? 2   ? 3   ? 4   5. Need to walk in hospital room?    ? 1   ? 2   ? 3 ? 4   6.  Climbing 3-5 steps with a railing? ? 1   ? 2   ? 3   ? 4   © 2007, Trustees of [address] [place] [address] [address] [id number], under license to [employer]. All rights reserved      Score:  Initial: 17 Most Recent: X (Date: -- )    Interpretation of Tool:  Represents activities that are increasingly more difficult (i.e. Bed mobility, Transfers, Gait). Medical Necessity:     · Patient is expected to demonstrate progress in strength, balance, coordination and functional technique  ·  to decrease assistance required with gait, transfers, and functional mobility. · .  Reason for Services/Other Comments:  · Patient continues to require skilled intervention due to decreased strength, decreased balance, decreased functional tolerance, decreased cardiopulmonary endurance affecting participation in basic ADLs and functional tasks   · . Use of outcome tool(s) and clinical judgement create a POC that gives a: Clear prediction of patient's progress: LOW COMPLEXITY            TREATMENT:   (In addition to Assessment/Re-Assessment sessions the following treatments were rendered)   Pre-treatment Symptoms/Complaints:  \"I needed a soak and some bath salts\"  Pain: Initial:   Pain Intensity 1: 0  Post Session: 0     Therapeutic Activity: (    12 minutes): Therapeutic activities including Bed transfers and Ambulation on level ground to improve balance and activity tolerance. Required minimal Verbal cues to promote dynamic balance in standing and safety awareness. Braces/Orthotics/Lines/Etc:   · Room air  Treatment/Session Assessment:    · Response to Treatment:  Excellent. · Interdisciplinary Collaboration:   o Physical Therapist  o Registered Nurse  o Physician  · After treatment position/precautions:   o RN notified  o MD at bedside  o Sitting EOB   · Compliance with Program/Exercises: Will assess as treatment progresses  · Recommendations/Intent for next treatment session:   \"Next visit will focus on advancements to more challenging activities and reduction in assistance provided\".   Total Treatment Duration:  PT Patient Time In/Time Out  Time In: 1055  Time Out: 304 Loco Lofton PT, DPT

## 2019-08-19 NOTE — PROGRESS NOTES
Received bedside shift report from off going nurse, William Felton. Patient resting in bed quietly. Respirations even and unlabored on room air. Bed low and locked. Bedside table, personal belongings and call light all within reach.

## 2019-08-19 NOTE — PROGRESS NOTES
Pt complains of not being able to have a BM for 3 weeks. Spoke with Dr. Cici Rhoades. New orders obtained.

## 2019-08-21 LAB
HCV GENOTYPE: NORMAL
HCV RNA SERPL NAA+PROBE-ACNC: NORMAL IU/ML
HCV RNA SERPL NAA+PROBE-LOG IU: NORMAL LOG10 IU/ML
TEST INFORMATION, 550045: NORMAL

## 2023-11-16 NOTE — PROGRESS NOTES
Spiritual Care Visit, initial visit. RN requested  to visit patient due to patient's emotional state.  has attempted to visit multiple times. Each time, patient was being attended by others (perhaps family).  unsure if visit is desired by patient. Will leave a message for week end  to follow up. Prayed for patient's healing and health. Visit by Ila Townsend, Staff .  SHERLEY.Sung., Th.B., B.A. Xerosis Aggressive Treatment: I recommended application of Cetaphil or CeraVe numerous times a day going to bed to all dry areas. I also prescribed a topical steroid for twice daily use.

## 2024-08-21 NOTE — PROGRESS NOTES
Dilaudid 2mg inj given slow IVP for 10/10 constant abdomen pain. Will continue to monitor. Patient called to schedule procedure in IR at [facility]  Confirmed patient is not taking  blood thinners, patient states that she is not taking aspirin.  No contrast Allergies  Biopsy scheduled for Monday 8/26/24 @ 1130, patient to arrive [facility] @ 1000.        Patient will receive sedation, will need a ride home.  Patient will need to go home with a responsible adult. For patient safety, the patient will not be allowed to drive him/herself home. Patient instructed to make necessary arrangements for transportation with family or friends. A taxi, bus or Uber is not acceptable transportation.      NPO X 8 hours prior to the procedure.  Patient verbalized full understanding of above instructions. Department number provided for any further questions. [phone]     Hematology notified.